# Patient Record
Sex: FEMALE | Race: WHITE | NOT HISPANIC OR LATINO | URBAN - METROPOLITAN AREA
[De-identification: names, ages, dates, MRNs, and addresses within clinical notes are randomized per-mention and may not be internally consistent; named-entity substitution may affect disease eponyms.]

---

## 2017-07-27 ENCOUNTER — OUTPATIENT (OUTPATIENT)
Dept: OUTPATIENT SERVICES | Facility: HOSPITAL | Age: 22
LOS: 1 days | Discharge: HOME | End: 2017-07-27

## 2017-07-27 DIAGNOSIS — M31.1 THROMBOTIC MICROANGIOPATHY: ICD-10-CM

## 2017-08-01 ENCOUNTER — RESULT REVIEW (OUTPATIENT)
Age: 22
End: 2017-08-01

## 2017-08-01 ENCOUNTER — APPOINTMENT (OUTPATIENT)
Dept: HEMATOLOGY ONCOLOGY | Facility: CLINIC | Age: 22
End: 2017-08-01
Payer: COMMERCIAL

## 2017-08-01 VITALS
WEIGHT: 276.68 LBS | DIASTOLIC BLOOD PRESSURE: 70 MMHG | RESPIRATION RATE: 16 BRPM | SYSTOLIC BLOOD PRESSURE: 106 MMHG | OXYGEN SATURATION: 98 % | HEART RATE: 71 BPM | TEMPERATURE: 98.5 F

## 2017-08-01 LAB
BASOPHILS # BLD AUTO: 0 K/UL — SIGNIFICANT CHANGE UP (ref 0–0.2)
BASOPHILS NFR BLD AUTO: 0.6 % — SIGNIFICANT CHANGE UP (ref 0–2)
EOSINOPHIL # BLD AUTO: 0.2 K/UL — SIGNIFICANT CHANGE UP (ref 0–0.5)
EOSINOPHIL NFR BLD AUTO: 1.8 % — SIGNIFICANT CHANGE UP (ref 0–6)
HCT VFR BLD CALC: 36 % — SIGNIFICANT CHANGE UP (ref 34.5–45)
HGB BLD-MCNC: 11.8 G/DL — SIGNIFICANT CHANGE UP (ref 11.5–15.5)
LYMPHOCYTES # BLD AUTO: 2.2 K/UL — SIGNIFICANT CHANGE UP (ref 1–3.3)
LYMPHOCYTES # BLD AUTO: 26.5 % — SIGNIFICANT CHANGE UP (ref 13–44)
MCHC RBC-ENTMCNC: 26.9 PG — LOW (ref 27–34)
MCHC RBC-ENTMCNC: 32.7 G/DL — SIGNIFICANT CHANGE UP (ref 32–36)
MCV RBC AUTO: 82.2 FL — SIGNIFICANT CHANGE UP (ref 80–100)
MONOCYTES # BLD AUTO: 0.5 K/UL — SIGNIFICANT CHANGE UP (ref 0–0.9)
MONOCYTES NFR BLD AUTO: 5.6 % — SIGNIFICANT CHANGE UP (ref 2–14)
NEUTROPHILS # BLD AUTO: 5.4 K/UL — SIGNIFICANT CHANGE UP (ref 1.8–7.4)
NEUTROPHILS NFR BLD AUTO: 65.6 % — SIGNIFICANT CHANGE UP (ref 43–77)
PLATELET # BLD AUTO: 260 K/UL — SIGNIFICANT CHANGE UP (ref 150–400)
RBC # BLD: 4.37 M/UL — SIGNIFICANT CHANGE UP (ref 3.8–5.2)
RBC # FLD: 15.4 % — HIGH (ref 10.3–14.5)
WBC # BLD: 8.3 K/UL — SIGNIFICANT CHANGE UP (ref 3.8–10.5)
WBC # FLD AUTO: 8.3 K/UL — SIGNIFICANT CHANGE UP (ref 3.8–10.5)

## 2017-08-01 PROCEDURE — 99213 OFFICE O/P EST LOW 20 MIN: CPT

## 2017-08-02 LAB
ALBUMIN SERPL ELPH-MCNC: 3.8 G/DL
ALP BLD-CCNC: 63 U/L
ALT SERPL-CCNC: 14 U/L
ANION GAP SERPL CALC-SCNC: 15 MMOL/L
AST SERPL-CCNC: 14 U/L
BILIRUB SERPL-MCNC: 0.3 MG/DL
BUN SERPL-MCNC: 11 MG/DL
CALCIUM SERPL-MCNC: 8.8 MG/DL
CHLORIDE SERPL-SCNC: 104 MMOL/L
CO2 SERPL-SCNC: 21 MMOL/L
CREAT SERPL-MCNC: 0.72 MG/DL
FERRITIN SERPL-MCNC: 33 NG/ML
GLUCOSE SERPL-MCNC: 98 MG/DL
IRON SATN MFR SERPL: 15 %
IRON SERPL-MCNC: 51 UG/DL
LDH SERPL-CCNC: 240 U/L
POTASSIUM SERPL-SCNC: 4.6 MMOL/L
PROT SERPL-MCNC: 6.6 G/DL
SODIUM SERPL-SCNC: 140 MMOL/L
TIBC SERPL-MCNC: 344 UG/DL
UIBC SERPL-MCNC: 293 UG/DL

## 2019-09-03 ENCOUNTER — EMERGENCY (EMERGENCY)
Facility: HOSPITAL | Age: 24
LOS: 1 days | Discharge: HOME | End: 2019-09-03
Attending: EMERGENCY MEDICINE
Payer: COMMERCIAL

## 2019-09-03 VITALS
SYSTOLIC BLOOD PRESSURE: 119 MMHG | OXYGEN SATURATION: 98 % | TEMPERATURE: 98 F | RESPIRATION RATE: 19 BRPM | DIASTOLIC BLOOD PRESSURE: 74 MMHG | HEART RATE: 67 BPM

## 2019-09-03 VITALS — WEIGHT: 279.99 LBS | HEIGHT: 65 IN

## 2019-09-03 LAB
ALBUMIN SERPL ELPH-MCNC: 4 G/DL — SIGNIFICANT CHANGE UP (ref 3.3–5)
ALP SERPL-CCNC: 66 U/L — SIGNIFICANT CHANGE UP (ref 40–120)
ALT FLD-CCNC: 15 U/L — SIGNIFICANT CHANGE UP (ref 10–45)
ANION GAP SERPL CALC-SCNC: 13 MMOL/L — SIGNIFICANT CHANGE UP (ref 5–17)
AST SERPL-CCNC: 12 U/L — SIGNIFICANT CHANGE UP (ref 10–40)
BASE EXCESS BLDV CALC-SCNC: -0.2 MMOL/L — SIGNIFICANT CHANGE UP (ref -2–2)
BASOPHILS # BLD AUTO: 0.1 K/UL — SIGNIFICANT CHANGE UP (ref 0–0.2)
BASOPHILS NFR BLD AUTO: 0.5 % — SIGNIFICANT CHANGE UP (ref 0–2)
BILIRUB SERPL-MCNC: 0.2 MG/DL — SIGNIFICANT CHANGE UP (ref 0.2–1.2)
BUN SERPL-MCNC: 8 MG/DL — SIGNIFICANT CHANGE UP (ref 7–23)
CA-I SERPL-SCNC: 1.16 MMOL/L — SIGNIFICANT CHANGE UP (ref 1.12–1.3)
CALCIUM SERPL-MCNC: 9.4 MG/DL — SIGNIFICANT CHANGE UP (ref 8.4–10.5)
CHLORIDE BLDV-SCNC: 111 MMOL/L — HIGH (ref 96–108)
CHLORIDE SERPL-SCNC: 105 MMOL/L — SIGNIFICANT CHANGE UP (ref 96–108)
CO2 BLDV-SCNC: 26 MMOL/L — SIGNIFICANT CHANGE UP (ref 22–30)
CO2 SERPL-SCNC: 20 MMOL/L — LOW (ref 22–31)
CREAT SERPL-MCNC: 0.66 MG/DL — SIGNIFICANT CHANGE UP (ref 0.5–1.3)
EOSINOPHIL # BLD AUTO: 0.1 K/UL — SIGNIFICANT CHANGE UP (ref 0–0.5)
EOSINOPHIL NFR BLD AUTO: 0.9 % — SIGNIFICANT CHANGE UP (ref 0–6)
GAS PNL BLDV: 135 MMOL/L — SIGNIFICANT CHANGE UP (ref 135–145)
GAS PNL BLDV: SIGNIFICANT CHANGE UP
GLUCOSE BLDV-MCNC: 94 MG/DL — SIGNIFICANT CHANGE UP (ref 70–99)
GLUCOSE SERPL-MCNC: 93 MG/DL — SIGNIFICANT CHANGE UP (ref 70–99)
HCG SERPL-ACNC: <2 MIU/ML — SIGNIFICANT CHANGE UP
HCO3 BLDV-SCNC: 25 MMOL/L — SIGNIFICANT CHANGE UP (ref 21–29)
HCT VFR BLD CALC: 38.8 % — SIGNIFICANT CHANGE UP (ref 34.5–45)
HCT VFR BLDA CALC: 40 % — SIGNIFICANT CHANGE UP (ref 39–50)
HGB BLD CALC-MCNC: 13 G/DL — SIGNIFICANT CHANGE UP (ref 11.5–15.5)
HGB BLD-MCNC: 12.5 G/DL — SIGNIFICANT CHANGE UP (ref 11.5–15.5)
LACTATE BLDV-MCNC: 1.4 MMOL/L — SIGNIFICANT CHANGE UP (ref 0.7–2)
LIDOCAIN IGE QN: 28 U/L — SIGNIFICANT CHANGE UP (ref 7–60)
LYMPHOCYTES # BLD AUTO: 28.5 % — SIGNIFICANT CHANGE UP (ref 13–44)
LYMPHOCYTES # BLD AUTO: 3.1 K/UL — SIGNIFICANT CHANGE UP (ref 1–3.3)
MCHC RBC-ENTMCNC: 27.1 PG — SIGNIFICANT CHANGE UP (ref 27–34)
MCHC RBC-ENTMCNC: 32.3 GM/DL — SIGNIFICANT CHANGE UP (ref 32–36)
MCV RBC AUTO: 84 FL — SIGNIFICANT CHANGE UP (ref 80–100)
MONOCYTES # BLD AUTO: 0.4 K/UL — SIGNIFICANT CHANGE UP (ref 0–0.9)
MONOCYTES NFR BLD AUTO: 3.8 % — SIGNIFICANT CHANGE UP (ref 2–14)
NEUTROPHILS # BLD AUTO: 7.3 K/UL — SIGNIFICANT CHANGE UP (ref 1.8–7.4)
NEUTROPHILS NFR BLD AUTO: 66.2 % — SIGNIFICANT CHANGE UP (ref 43–77)
PCO2 BLDV: 45 MMHG — SIGNIFICANT CHANGE UP (ref 35–50)
PH BLDV: 7.36 — SIGNIFICANT CHANGE UP (ref 7.35–7.45)
PLATELET # BLD AUTO: 336 K/UL — SIGNIFICANT CHANGE UP (ref 150–400)
PO2 BLDV: 30 MMHG — SIGNIFICANT CHANGE UP (ref 25–45)
POTASSIUM BLDV-SCNC: 3.9 MMOL/L — SIGNIFICANT CHANGE UP (ref 3.5–5.3)
POTASSIUM SERPL-MCNC: 4.4 MMOL/L — SIGNIFICANT CHANGE UP (ref 3.5–5.3)
POTASSIUM SERPL-SCNC: 4.4 MMOL/L — SIGNIFICANT CHANGE UP (ref 3.5–5.3)
PROT SERPL-MCNC: 7 G/DL — SIGNIFICANT CHANGE UP (ref 6–8.3)
RBC # BLD: 4.62 M/UL — SIGNIFICANT CHANGE UP (ref 3.8–5.2)
RBC # FLD: 13.7 % — SIGNIFICANT CHANGE UP (ref 10.3–14.5)
SAO2 % BLDV: 46 % — LOW (ref 67–88)
SODIUM SERPL-SCNC: 138 MMOL/L — SIGNIFICANT CHANGE UP (ref 135–145)
WBC # BLD: 11 K/UL — HIGH (ref 3.8–10.5)
WBC # FLD AUTO: 11 K/UL — HIGH (ref 3.8–10.5)

## 2019-09-03 PROCEDURE — 76705 ECHO EXAM OF ABDOMEN: CPT

## 2019-09-03 PROCEDURE — 80053 COMPREHEN METABOLIC PANEL: CPT

## 2019-09-03 PROCEDURE — 85014 HEMATOCRIT: CPT

## 2019-09-03 PROCEDURE — 99284 EMERGENCY DEPT VISIT MOD MDM: CPT

## 2019-09-03 PROCEDURE — 84132 ASSAY OF SERUM POTASSIUM: CPT

## 2019-09-03 PROCEDURE — 99284 EMERGENCY DEPT VISIT MOD MDM: CPT | Mod: 25

## 2019-09-03 PROCEDURE — 83605 ASSAY OF LACTIC ACID: CPT

## 2019-09-03 PROCEDURE — 82435 ASSAY OF BLOOD CHLORIDE: CPT

## 2019-09-03 PROCEDURE — 76705 ECHO EXAM OF ABDOMEN: CPT | Mod: 26

## 2019-09-03 PROCEDURE — 82330 ASSAY OF CALCIUM: CPT

## 2019-09-03 PROCEDURE — 83690 ASSAY OF LIPASE: CPT

## 2019-09-03 PROCEDURE — 85027 COMPLETE CBC AUTOMATED: CPT

## 2019-09-03 PROCEDURE — 82803 BLOOD GASES ANY COMBINATION: CPT

## 2019-09-03 PROCEDURE — 96374 THER/PROPH/DIAG INJ IV PUSH: CPT

## 2019-09-03 PROCEDURE — 82947 ASSAY GLUCOSE BLOOD QUANT: CPT

## 2019-09-03 PROCEDURE — 84702 CHORIONIC GONADOTROPIN TEST: CPT

## 2019-09-03 PROCEDURE — 84295 ASSAY OF SERUM SODIUM: CPT

## 2019-09-03 RX ORDER — ACETAMINOPHEN 500 MG
650 TABLET ORAL ONCE
Refills: 0 | Status: COMPLETED | OUTPATIENT
Start: 2019-09-03 | End: 2019-09-03

## 2019-09-03 RX ORDER — FAMOTIDINE 10 MG/ML
20 INJECTION INTRAVENOUS ONCE
Refills: 0 | Status: COMPLETED | OUTPATIENT
Start: 2019-09-03 | End: 2019-09-03

## 2019-09-03 RX ORDER — SODIUM CHLORIDE 9 MG/ML
1000 INJECTION INTRAMUSCULAR; INTRAVENOUS; SUBCUTANEOUS ONCE
Refills: 0 | Status: COMPLETED | OUTPATIENT
Start: 2019-09-03 | End: 2019-09-03

## 2019-09-03 RX ADMIN — FAMOTIDINE 20 MILLIGRAM(S): 10 INJECTION INTRAVENOUS at 11:30

## 2019-09-03 RX ADMIN — Medication 650 MILLIGRAM(S): at 11:30

## 2019-09-03 RX ADMIN — SODIUM CHLORIDE 1000 MILLILITER(S): 9 INJECTION INTRAMUSCULAR; INTRAVENOUS; SUBCUTANEOUS at 11:31

## 2019-09-03 RX ADMIN — Medication 30 MILLILITER(S): at 11:30

## 2019-09-03 NOTE — ED ADULT NURSE NOTE - HOW OFTEN DO YOU HAVE A DRINK CONTAINING ALCOHOL?
S:  O:  VS reviewed, afebrile   Vitals:    19 1932 19   BP: 111/84 121/74 107/76 105/65   Pulse: 106 81 84 82   Temp:       TempSrc:       Weight:       Height:           FHTs 135, cat 1  UC 3-5  SVE 6/80/-2  AROM of clear fluid    A: IUP @ 38w6d ;     Patient Active Problem List   Diagnosis    Incompetence of cervix    Contraceptive management    History of cerclage, currently pregnant    placement of cervical cerclage, 2nd trimester    Anemia    Normal labor       P:   Continue close monitoring of maternal/fetal status  Anticipate      Never

## 2019-09-03 NOTE — ED ADULT NURSE NOTE - OBJECTIVE STATEMENT
23 yr old female with mother from home c/o persistent epigastric burning, abd cramping, vaginal bleeding, 2nd menstrual cycle in 1 month, +on oral contraceptives, hx TTP, at present no c/o

## 2019-09-03 NOTE — ED PROVIDER NOTE - CLINICAL SUMMARY MEDICAL DECISION MAKING FREE TEXT BOX
cezar 23 f with serveral days of n/v/d now resolved w persitant epigastric pain - pocus to eval for biliary dz, ck lfts and lipase iv hydration and h2 blocker and reeval

## 2019-09-03 NOTE — ED PROVIDER NOTE - PROGRESS NOTE DETAILS
pt feeling mild improvement -- labs reviewed lfts and lipase unremarkable -- rec fu outpt possible endoscopy for persistatn s/s =

## 2019-09-03 NOTE — ED PROVIDER NOTE - PATIENT PORTAL LINK FT
You can access the FollowMyHealth Patient Portal offered by [facility] by registering at the following website: [email or web address]. By joining [employer]’s FollowMyHealth portal, you will also be able to view your health information using other applications (apps) compatible with our system.

## 2019-09-03 NOTE — ED PROVIDER NOTE - NSFOLLOWUPCLINICS_GEN_ALL_ED_FT
[facility]astroenter[facility]  Gastroenterology  [address] [place] [id number]  Phone: [phone]  Fax:   Follow Up Time:

## 2019-09-03 NOTE — ED ADULT TRIAGE NOTE - CHIEF COMPLAINT QUOTE
sat: n/v/d abd pain, dec PO, sun: eating better v/d stopped, still cramping, epig burning on sat: n/v/d abd pain, dec PO, sunday: eating better v/d stopped, still cramping, epig burning, +"2nd period in 1 month", +hx TTP

## 2019-09-03 NOTE — ED PROVIDER NOTE - NSFOLLOWUPINSTRUCTIONS_ED_ALL_ED_FT
You were seen in the emergency department for abdominal pain, vomiting and diarrhea. Please follow up with your primary doctor and a gastroenterologist. Take pepcid 20 mg twice a day and tylenol 650 mg every 6 hours for continued pain. Return to the emergency department immediately if you experience fever, inability to keep food down, or any other concerning symptoms.

## 2019-09-03 NOTE — ED ADULT NURSE NOTE - CHIEF COMPLAINT QUOTE
on sat: n/v/d abd pain, dec PO, sunday: eating better v/d stopped, still cramping, epig burning, +"2nd period in 1 month", +hx TTP

## 2019-09-03 NOTE — ED PROVIDER NOTE - OBJECTIVE STATEMENT
23f w hx TTP 5 yrs ago here c/o abd pain, n/v/d. Pt states sx started 4 days ago. Was vomiting NBNB until 2 days ago and had watery, non-bloody diarrhea until yesterday, but nausea and abd pain have persisted. Pain is epigastric, sharp, radiating up to chest, intermittent in nature, w no obvious alleviating or exacerbating factors. Drinks ETOH socially once every 1-2 wks. No melena or brbpr, no recent travel. Currently menstruating, on OCPs. No urinary sx. Unknown whether fever-did not  check temp. No hx abd surg.

## 2020-08-14 ENCOUNTER — EMERGENCY (EMERGENCY)
Facility: HOSPITAL | Age: 25
LOS: 1 days | Discharge: LEFT BEFORE DISCHARGE | End: 2020-08-14
Attending: EMERGENCY MEDICINE
Payer: COMMERCIAL

## 2020-08-14 VITALS
RESPIRATION RATE: 18 BRPM | WEIGHT: 259.93 LBS | TEMPERATURE: 99 F | HEART RATE: 100 BPM | DIASTOLIC BLOOD PRESSURE: 69 MMHG | SYSTOLIC BLOOD PRESSURE: 132 MMHG | HEIGHT: 65 IN | OXYGEN SATURATION: 97 %

## 2020-08-14 PROBLEM — M31.1 THROMBOTIC MICROANGIOPATHY: Chronic | Status: ACTIVE | Noted: 2019-09-03

## 2020-08-14 PROCEDURE — 90715 TDAP VACCINE 7 YRS/> IM: CPT

## 2020-08-14 PROCEDURE — 73620 X-RAY EXAM OF FOOT: CPT | Mod: 26,LT

## 2020-08-14 PROCEDURE — 73620 X-RAY EXAM OF FOOT: CPT

## 2020-08-14 PROCEDURE — 90471 IMMUNIZATION ADMIN: CPT

## 2020-08-14 PROCEDURE — 99283 EMERGENCY DEPT VISIT LOW MDM: CPT | Mod: 25

## 2020-08-14 PROCEDURE — 99284 EMERGENCY DEPT VISIT MOD MDM: CPT

## 2020-08-14 RX ORDER — CEPHALEXIN 500 MG
500 CAPSULE ORAL ONCE
Refills: 0 | Status: COMPLETED | OUTPATIENT
Start: 2020-08-14 | End: 2020-08-14

## 2020-08-14 RX ORDER — TETANUS TOXOID, REDUCED DIPHTHERIA TOXOID AND ACELLULAR PERTUSSIS VACCINE, ADSORBED 5; 2.5; 8; 8; 2.5 [IU]/.5ML; [IU]/.5ML; UG/.5ML; UG/.5ML; UG/.5ML
0.5 SUSPENSION INTRAMUSCULAR ONCE
Refills: 0 | Status: COMPLETED | OUTPATIENT
Start: 2020-08-14 | End: 2020-08-14

## 2020-08-14 RX ORDER — CEPHALEXIN 500 MG
1 CAPSULE ORAL
Qty: 20 | Refills: 0
Start: 2020-08-14 | End: 2020-08-18

## 2020-08-14 RX ADMIN — Medication 500 MILLIGRAM(S): at 02:39

## 2020-08-14 RX ADMIN — TETANUS TOXOID, REDUCED DIPHTHERIA TOXOID AND ACELLULAR PERTUSSIS VACCINE, ADSORBED 0.5 MILLILITER(S): 5; 2.5; 8; 8; 2.5 SUSPENSION INTRAMUSCULAR at 02:40

## 2020-08-14 NOTE — ED PROVIDER NOTE - NSFOLLOWUPINSTRUCTIONS_ED_ALL_ED_FT
You were evaluated after stepping on a nail.    Your xray was negative for any foreign bodies or fractures.    You were given a tetanus shot.    1) Follow up with your doctor as needed  2) Return to the ED immediately for new or worsening symptoms. Please keep an eye out for signs of infection where you stepped on the nail such as redness, swelling, or pus draining from the wound.  3) Please continue to take any home medications as prescribed  4) Your test results from your ED visit were discussed with you prior to discharge  5) You were provided with a copy of your test results  6) Please  your medicine at the pharmacy and take as directed

## 2020-08-14 NOTE — ED ADULT NURSE NOTE - NSIMPLEMENTINTERV_GEN_ALL_ED
Implemented All Universal Safety Interventions:  [place] to call system. Call bell, personal items and telephone within reach. Instruct patient to call for assistance. Room bathroom lighting operational. Non-slip footwear when patient is off stretcher. Physically safe environment: no spills, clutter or unnecessary equipment. Stretcher in lowest position, wheels locked, appropriate side rails in place.

## 2020-08-14 NOTE — ED PROVIDER NOTE - PMH
Cold sore    High cholesterol    Iron deficiency    TTP (thrombotic thrombocytopenic purpura)    TTP (thrombotic thrombocytopenic purpura)

## 2020-08-14 NOTE — ED PROVIDER NOTE - PATIENT PORTAL LINK FT
You can access the FollowMyHealth Patient Portal offered by [facility] by registering at the following website: [email or web address]. By joining [employer]’s FollowMyHealth portal, you will also be able to view your health information using other applications (apps) compatible with our system.

## 2020-08-14 NOTE — ED PROVIDER NOTE - PROGRESS NOTE DETAILS
Dr. Nhan Ojeda, PGY-3: pt w/o foreign body on xray. To be treated prophylactically w/ keflex. Return precautions provided, pt verbalized understanding. Ready for DC.

## 2020-08-14 NOTE — ED PROVIDER NOTE - PHYSICAL EXAMINATION
Gen: NAD, AOx3, able to make needs known, non-toxic  Head: NCAT  HEENT: EOMI, oral mucosa moist, normal conjunctiva  Lung: CTAB, no respiratory distress, no wheezes/rhonchi/rales B/L, speaking in full sentences  CV: RRR, no murmurs  Abd: soft, NTND, no guarding, no CVA tenderness  MSK: no visible deformities. L lateral foot just below 5th digit, punctate entry wound w/o surrounding erythema or edema that is non tender. No appreciable foreign bodies palpated in foot.  Neuro: Appears non focal  Skin: Warm, well perfused  Psych: normal affect

## 2020-08-14 NOTE — ED PROVIDER NOTE - OBJECTIVE STATEMENT
23 y/o F w/ PMH of HLD, iron deficiency, TTP presenting w/ a complaint of stepping on nail. [place] room 4. Pt reports shortly prior to ED arrival she was walking barefoot in the driveway when she stepped on julissa nail w/ her L foot. Cleaned the wound, applied, bacitracin, and came to ED for evaluation. Minimal pain. Did not take any medication prior to ED arrival. Denies fevers, chills, headache, dizziness, blurred vision, chest pain, cough, shortness of breath, abdominal pain, n/v/d/c, urinary symptoms, rash.

## 2020-08-14 NOTE — ED PROVIDER NOTE - CLINICAL SUMMARY MEDICAL DECISION MAKING FREE TEXT BOX
25 y/o F w/ PMH of HLD, iron deficiency, TTP presenting w/ a puncture wound 2/2 to stepping on nail. Pt well appearing. Exam of foot benign. Pt unsure of last tetanus. Likely soft tissue injury only, but will eval for bony pathology and/or possible foreign body. Plan for adacel, keflex ppx, xray. Expect DC. Will reassess the need for additional interventions as clinically warranted.

## 2020-08-14 NOTE — ED ADULT NURSE NOTE - OBJECTIVE STATEMENT
24 year old female pt presented to the ED co nail to left foot, pt states she was walking barefoot when she felt a sharp pain to bottom of foot, pt states it was a small nail, no bleeding, no swelling no drainage to site , pt able to ambulate no other complaints

## 2020-09-28 ENCOUNTER — OUTPATIENT (OUTPATIENT)
Dept: OUTPATIENT SERVICES | Facility: HOSPITAL | Age: 25
LOS: 1 days | Discharge: HOME | End: 2020-09-28

## 2020-09-28 ENCOUNTER — INPATIENT (INPATIENT)
Facility: HOSPITAL | Age: 25
LOS: 11 days | Discharge: HOME | DRG: 546 | End: 2020-10-10
Attending: HOSPITALIST | Admitting: STUDENT IN AN ORGANIZED HEALTH CARE EDUCATION/TRAINING PROGRAM
Payer: COMMERCIAL

## 2020-09-28 VITALS
SYSTOLIC BLOOD PRESSURE: 139 MMHG | DIASTOLIC BLOOD PRESSURE: 96 MMHG | WEIGHT: 270.07 LBS | OXYGEN SATURATION: 98 % | TEMPERATURE: 99 F | HEIGHT: 65 IN | HEART RATE: 116 BPM | RESPIRATION RATE: 18 BRPM

## 2020-09-28 DIAGNOSIS — M31.1 THROMBOTIC MICROANGIOPATHY: ICD-10-CM

## 2020-09-28 LAB
ALBUMIN SERPL ELPH-MCNC: 4.4 G/DL — SIGNIFICANT CHANGE UP (ref 3.3–5)
ALP SERPL-CCNC: 68 U/L — SIGNIFICANT CHANGE UP (ref 40–120)
ALT FLD-CCNC: 20 U/L — SIGNIFICANT CHANGE UP (ref 10–45)
ANION GAP SERPL CALC-SCNC: 14 MMOL/L — SIGNIFICANT CHANGE UP (ref 5–17)
APPEARANCE UR: CLEAR — SIGNIFICANT CHANGE UP
APTT BLD: 33.3 SEC — SIGNIFICANT CHANGE UP (ref 27.5–35.5)
AST SERPL-CCNC: 33 U/L — SIGNIFICANT CHANGE UP (ref 10–40)
BACTERIA # UR AUTO: NEGATIVE — SIGNIFICANT CHANGE UP
BASE EXCESS BLDV CALC-SCNC: -0.7 MMOL/L — SIGNIFICANT CHANGE UP (ref -2–2)
BILIRUB SERPL-MCNC: 0.6 MG/DL — SIGNIFICANT CHANGE UP (ref 0.2–1.2)
BILIRUB UR-MCNC: NEGATIVE — SIGNIFICANT CHANGE UP
BUN SERPL-MCNC: 20 MG/DL — SIGNIFICANT CHANGE UP (ref 7–23)
CA-I SERPL-SCNC: 1.12 MMOL/L — SIGNIFICANT CHANGE UP (ref 1.12–1.3)
CALCIUM SERPL-MCNC: 10.1 MG/DL — SIGNIFICANT CHANGE UP (ref 8.4–10.5)
CHLORIDE BLDV-SCNC: 107 MMOL/L — SIGNIFICANT CHANGE UP (ref 96–108)
CHLORIDE SERPL-SCNC: 102 MMOL/L — SIGNIFICANT CHANGE UP (ref 96–108)
CO2 BLDV-SCNC: 26 MMOL/L — SIGNIFICANT CHANGE UP (ref 22–30)
CO2 SERPL-SCNC: 22 MMOL/L — SIGNIFICANT CHANGE UP (ref 22–31)
COLOR SPEC: SIGNIFICANT CHANGE UP
CREAT SERPL-MCNC: 0.87 MG/DL — SIGNIFICANT CHANGE UP (ref 0.5–1.3)
DIFF PNL FLD: ABNORMAL
EPI CELLS # UR: 2 /HPF — SIGNIFICANT CHANGE UP
GAS PNL BLDV: 129 MMOL/L — LOW (ref 135–145)
GAS PNL BLDV: SIGNIFICANT CHANGE UP
GAS PNL BLDV: SIGNIFICANT CHANGE UP
GLUCOSE BLDV-MCNC: 93 MG/DL — SIGNIFICANT CHANGE UP (ref 70–99)
GLUCOSE SERPL-MCNC: 97 MG/DL — SIGNIFICANT CHANGE UP (ref 70–99)
GLUCOSE UR QL: NEGATIVE — SIGNIFICANT CHANGE UP
HCG SERPL-ACNC: <2 MIU/ML — SIGNIFICANT CHANGE UP
HCO3 BLDV-SCNC: 25 MMOL/L — SIGNIFICANT CHANGE UP (ref 21–29)
HCT VFR BLD CALC: 33.1 % — LOW (ref 34.5–45)
HCT VFR BLD CALC: 35.2 % — SIGNIFICANT CHANGE UP (ref 34.5–45)
HCT VFR BLDA CALC: 36 % — LOW (ref 39–50)
HGB BLD CALC-MCNC: 11.6 G/DL — SIGNIFICANT CHANGE UP (ref 11.5–15.5)
HGB BLD-MCNC: 10.7 G/DL — LOW (ref 11.5–15.5)
HGB BLD-MCNC: 11.4 G/DL — LOW (ref 11.5–15.5)
HYALINE CASTS # UR AUTO: 2 /LPF — SIGNIFICANT CHANGE UP (ref 0–2)
INR BLD: 1.07 RATIO — SIGNIFICANT CHANGE UP (ref 0.88–1.16)
KETONES UR-MCNC: NEGATIVE — SIGNIFICANT CHANGE UP
LACTATE BLDV-MCNC: 1.4 MMOL/L — SIGNIFICANT CHANGE UP (ref 0.7–2)
LDH SERPL L TO P-CCNC: 540 U/L — HIGH (ref 50–242)
LEUKOCYTE ESTERASE UR-ACNC: NEGATIVE — SIGNIFICANT CHANGE UP
MCHC RBC-ENTMCNC: 26.6 PG — LOW (ref 27–34)
MCHC RBC-ENTMCNC: 26.8 PG — LOW (ref 27–34)
MCHC RBC-ENTMCNC: 32.3 GM/DL — SIGNIFICANT CHANGE UP (ref 32–36)
MCHC RBC-ENTMCNC: 32.4 GM/DL — SIGNIFICANT CHANGE UP (ref 32–36)
MCV RBC AUTO: 82.2 FL — SIGNIFICANT CHANGE UP (ref 80–100)
MCV RBC AUTO: 83 FL — SIGNIFICANT CHANGE UP (ref 80–100)
NITRITE UR-MCNC: NEGATIVE — SIGNIFICANT CHANGE UP
NRBC # BLD: 0 /100 WBCS — SIGNIFICANT CHANGE UP (ref 0–0)
NRBC # BLD: 0 /100 WBCS — SIGNIFICANT CHANGE UP (ref 0–0)
PCO2 BLDV: 47 MMHG — SIGNIFICANT CHANGE UP (ref 35–50)
PH BLDV: 7.34 — LOW (ref 7.35–7.45)
PH UR: 6 — SIGNIFICANT CHANGE UP (ref 5–8)
PLATELET # BLD AUTO: 21 K/UL — LOW (ref 150–400)
PLATELET # BLD AUTO: 27 K/UL — LOW (ref 150–400)
PO2 BLDV: 20 MMHG — LOW (ref 25–45)
POTASSIUM BLDV-SCNC: 6.9 MMOL/L — CRITICAL HIGH (ref 3.5–5.3)
POTASSIUM SERPL-MCNC: 4.1 MMOL/L — SIGNIFICANT CHANGE UP (ref 3.5–5.3)
POTASSIUM SERPL-SCNC: 4.1 MMOL/L — SIGNIFICANT CHANGE UP (ref 3.5–5.3)
PROT SERPL-MCNC: 7.2 G/DL — SIGNIFICANT CHANGE UP (ref 6–8.3)
PROT UR-MCNC: 100 — SIGNIFICANT CHANGE UP
PROTHROM AB SERPL-ACNC: 12.7 SEC — SIGNIFICANT CHANGE UP (ref 10.6–13.6)
RBC # BLD: 3.99 M/UL — SIGNIFICANT CHANGE UP (ref 3.8–5.2)
RBC # BLD: 4.28 M/UL — SIGNIFICANT CHANGE UP (ref 3.8–5.2)
RBC # FLD: 14.9 % — HIGH (ref 10.3–14.5)
RBC # FLD: 15.1 % — HIGH (ref 10.3–14.5)
RBC CASTS # UR COMP ASSIST: 10 /HPF — HIGH (ref 0–4)
SAO2 % BLDV: 25 % — LOW (ref 67–88)
SARS-COV-2 RNA SPEC QL NAA+PROBE: SIGNIFICANT CHANGE UP
SODIUM SERPL-SCNC: 138 MMOL/L — SIGNIFICANT CHANGE UP (ref 135–145)
SP GR SPEC: 1.01 — SIGNIFICANT CHANGE UP (ref 1.01–1.02)
UROBILINOGEN FLD QL: NEGATIVE — SIGNIFICANT CHANGE UP
WBC # BLD: 12.81 K/UL — HIGH (ref 3.8–10.5)
WBC # BLD: 14.09 K/UL — HIGH (ref 3.8–10.5)
WBC # FLD AUTO: 12.81 K/UL — HIGH (ref 3.8–10.5)
WBC # FLD AUTO: 14.09 K/UL — HIGH (ref 3.8–10.5)
WBC UR QL: 3 /HPF — SIGNIFICANT CHANGE UP (ref 0–5)

## 2020-09-28 PROCEDURE — 99285 EMERGENCY DEPT VISIT HI MDM: CPT

## 2020-09-28 PROCEDURE — 71045 X-RAY EXAM CHEST 1 VIEW: CPT | Mod: 26

## 2020-09-28 PROCEDURE — 93010 ELECTROCARDIOGRAM REPORT: CPT | Mod: 59

## 2020-09-28 RX ORDER — SODIUM CHLORIDE 9 MG/ML
1000 INJECTION INTRAMUSCULAR; INTRAVENOUS; SUBCUTANEOUS
Refills: 0 | Status: DISCONTINUED | OUTPATIENT
Start: 2020-09-28 | End: 2020-09-29

## 2020-09-28 RX ADMIN — SODIUM CHLORIDE 100 MILLILITER(S): 9 INJECTION INTRAMUSCULAR; INTRAVENOUS; SUBCUTANEOUS at 20:50

## 2020-09-28 NOTE — CONSULT NOTE ADULT - ATTENDING COMMENTS
C/D/W Dr. Vo overnight via telephone.    Consult team to round on patient on 9/29.      Kishan Ibrahim MD

## 2020-09-28 NOTE — ED PROVIDER NOTE - NS_ ATTENDINGSCRIBEDETAILS _ED_A_ED_FT
History and Physical performed by me with scribe under my direct supervision.  HARESH Tomlin MD FACEP

## 2020-09-28 NOTE — CONSULT NOTE ADULT - ASSESSMENT
24 F with hx of TTP (followed by Dr. Berman at [facility]) s/p 22 sessions of PLEX at [facility] in 2014 followed by 4 doses of weekly Rituxan presenting with platelet count of 21K concerning for relapse of TTP.     #Relapsed TTP:  Patient was initially diagnosed at age 18 and was treated at [facility] with 22 sessions of PLEX followed by Rituxan by Dr. Berman at [facility]  Pt now with plt count of 21K (plt was 336K September 3rd)  Peripheral smear with 3-5 schistocytes per HPF  LDH and retic elevated, haptoglobin sent  [id number] level sent overnight   Check hepatitis panel  PLASMIC score of 7 indication high risk   Appreciate MICU eval and placement of shiley catheter to start PLEX- pt consented to PLEX per blood bank- Please call blood bank directly to inform them when a catheter has been placed so that they can start PLEX. In the interim, please give patient 2 units of FFP.   Started pulse dose steroids with methylprednisolone 1g IV daily for 3 days- please give stat dose tonight. 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brahim[facility] attending Dr. Blanco,    Nadiya Vo MD  Hematology Oncology Fellow, PGY-5  [facility] Pager: [phone]/ [facility] Pager: [phone]   24 F with hx of TTP (followed by Dr. Berman at [facility]) s/p 22 sessions of PLEX at [facility] in 2014 followed by 4 doses of weekly Rituxan presenting with platelet count of 21K concerning for relapse of TTP.     #Relapsed TTP:  Patient was initially diagnosed at age 18 and was treated at [facility] with 22 sessions of PLEX followed by Rituxan by Dr. Berman at [facility]  Pt now with plt count of 21K (plt was 336K September 3rd)  Peripheral smear with 3-5 schistocytes per HPF  LDH and retic elevated, haptoglobin sent  [id number] level sent overnight   Check hepatitis panel  PLASMIC score of 7 indicating high risk   Appreciate MICU eval and placement of shiley catheter to start PLEX- pt consented to PLEX per blood bank- Please call blood bank directly to inform them when a catheter has been placed so that they can start PLEX. In the interim, please give patient 2 units of FFP.   Started pulse dose steroids with methylprednisolone 1g IV daily for 3 days- please give stat dose tonight. 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 Ibrahim, MICU, blood bank attending Dr. Blanco,    Nadiya Vo MD  Hematology Oncology Fellow, PGY-5  [facility] Pager: [phone]/ [facility] Pager: [phone]

## 2020-09-28 NOTE — ED ADULT NURSE NOTE - NSIMPLEMENTINTERV_GEN_ALL_ED
Implemented All Universal Safety Interventions:  [place] to call system. Call bell, personal items and telephone within reach. Instruct patient to call for assistance. Room bathroom lighting operational. Non-slip footwear when patient is off stretcher. Physically safe environment: no spills, clutter or unnecessary equipment. Stretcher in lowest position, wheels locked, appropriate side rails in place.

## 2020-09-28 NOTE — ED PROVIDER NOTE - OBJECTIVE STATEMENT
24 y.o F with pmhx of HLD, iron deficiency, TTP in 2014, and bilateral hip replacements presents with x3 days of bruising through her extremities. Pt reports largest bruise on forearm was 2/2 dog bite. States she noted on Friday bruising on bilateral lower extremities and upper extremities. Noted faint petechiae on lower extremities last night. Pt also had cyst under her arm and was prescribed doxy by dermatologist. Reports endorsing multiple vitamins for past three weeks. +emesis on Thursday and Friday. Denies hematuria, fever, chills, HA, cough, tobacco use, recent travels, or etoh use. 24 y.o F with pmhx of HLD, iron deficiency, TTP in 2014, and bilateral hip replacements presents with x3 days of bruising through her extremities. Pt reports largest bruise on forearm was 2/2 dog bite. States she noted on Friday bruising on bilateral lower extremities and upper extremities. Noted faint petechiae on lower extremities last night. Pt also had cyst under her arm and was prescribed doxy by dermatologist. Reports endorsing multiple vitamins for past three weeks. +emesis on Thursday and Friday. Denies hematuria, fever, chills, HA, cough, tobacco use, recent travels, or etoh use.  Hematologist: Dr. Berman ~[facility]/onc [facility]  24 y.o F with pmhx of HLD, iron deficiency, TTP in 2014, and bilateral hip replacements presents with x3 days of bruising through her extremities. Pt reports largest bruise on forearm was 2/2 dog bite. States she noted on Friday bruising on bilateral lower extremities and upper extremities. Noted faint petechiae on lower extremities last night. Pt also had cyst under her arm and was prescribed doxy by dermatologist. Reports endorsing multiple vitamins for past three weeks. +emesis on Thursday and Friday. Denies hematuria, fever, chills, HA, cough, tobacco use, recent travels, or etoh use.  Hematologist: Dr. Berman ~[facility]

## 2020-09-28 NOTE — ED ADULT NURSE NOTE - OBJECTIVE STATEMENT
complaining of bruising since friday. Pt states, "I have TTP, I was treating 6 years ago and have been in remission since. I think Im having a relapse, I have bruising on my left arm and thighs and I didn't bang them on anything." Pt endorses bruising. Pt denies headache, lightheadedness, dizziness, blurred vision, SOB, chest pain, abdominal pain, N.V.D. urinary symptoms, numbness and tingling at present.

## 2020-09-28 NOTE — ED PROVIDER NOTE - PROGRESS NOTE DETAILS
MD Jayy: Patient reassessed at bedside and found to be lying comfortably in bed. On further discussion with patient, she may or may not have had a cold ~2 weeks ago resulting in increased rhinorrhea and nasal congestion but may attribute it to the changing of weather. Will await complete lab results and reevaluate. [facility] paged  Lance Tomlin MD, Facep MD Jayy: Discussed w/ patient the significant platelet value of 27 and the need to workup platelet disorder further as inpatient. Patient recalls last time she had her platelets in the 20's she had further downtrending platelets in the 10's and felt generally weak. Plan to repeat cbc and other labs per heme

## 2020-09-28 NOTE — ED ADULT TRIAGE NOTE - CHIEF COMPLAINT QUOTE
Hx of thrombotic thrombocytopenic purpura. Patient states that she started to get multiple bruises since Friday, petechiae  on hands and chest since last night.  denies known accident/injury. Has been taking tumeric for the last 4 weeks.

## 2020-09-28 NOTE — ED PROVIDER NOTE - CLINICAL SUMMARY MEDICAL DECISION MAKING FREE TEXT BOX
Lance Tomlin): Adult female with three day hx of bruising. Remote hx of TTP otherwise well. Largest upper extremity bruising caused by dog bite. Lower extremity bruising are very small. No bloody blisters in mouth. Will obtain labs, LDH, and if all negative, will consider discharge for outpatient follow up.

## 2020-09-29 ENCOUNTER — APPOINTMENT (OUTPATIENT)
Dept: HEMATOLOGY ONCOLOGY | Facility: CLINIC | Age: 25
End: 2020-09-29

## 2020-09-29 LAB
ALBUMIN SERPL ELPH-MCNC: 4.3 G/DL — SIGNIFICANT CHANGE UP (ref 3.3–5)
ALP SERPL-CCNC: 70 U/L — SIGNIFICANT CHANGE UP (ref 40–120)
ALT FLD-CCNC: 22 U/L — SIGNIFICANT CHANGE UP (ref 10–45)
ANION GAP SERPL CALC-SCNC: 14 MMOL/L — SIGNIFICANT CHANGE UP (ref 5–17)
APTT BLD: 30 SEC — SIGNIFICANT CHANGE UP (ref 27.5–35.5)
AST SERPL-CCNC: 35 U/L — SIGNIFICANT CHANGE UP (ref 10–40)
BASE EXCESS BLDV CALC-SCNC: -1.2 MMOL/L — SIGNIFICANT CHANGE UP (ref -2–2)
BASOPHILS # BLD AUTO: 0.03 K/UL — SIGNIFICANT CHANGE UP (ref 0–0.2)
BASOPHILS NFR BLD AUTO: 0.3 % — SIGNIFICANT CHANGE UP (ref 0–2)
BILIRUB SERPL-MCNC: 0.9 MG/DL — SIGNIFICANT CHANGE UP (ref 0.2–1.2)
BUN SERPL-MCNC: 16 MG/DL — SIGNIFICANT CHANGE UP (ref 7–23)
CA-I SERPL-SCNC: 1.25 MMOL/L — SIGNIFICANT CHANGE UP (ref 1.12–1.3)
CALCIUM SERPL-MCNC: 10 MG/DL — SIGNIFICANT CHANGE UP (ref 8.4–10.5)
CHLORIDE BLDV-SCNC: 106 MMOL/L — SIGNIFICANT CHANGE UP (ref 96–108)
CHLORIDE SERPL-SCNC: 105 MMOL/L — SIGNIFICANT CHANGE UP (ref 96–108)
CO2 BLDV-SCNC: 25 MMOL/L — SIGNIFICANT CHANGE UP (ref 22–30)
CO2 SERPL-SCNC: 19 MMOL/L — LOW (ref 22–31)
CREAT SERPL-MCNC: 0.67 MG/DL — SIGNIFICANT CHANGE UP (ref 0.5–1.3)
EOSINOPHIL # BLD AUTO: 0 K/UL — SIGNIFICANT CHANGE UP (ref 0–0.5)
EOSINOPHIL NFR BLD AUTO: 0 % — SIGNIFICANT CHANGE UP (ref 0–6)
FIBRINOGEN PPP-MCNC: 623 MG/DL — HIGH (ref 350–510)
GAS PNL BLDV: 139 MMOL/L — SIGNIFICANT CHANGE UP (ref 135–145)
GAS PNL BLDV: SIGNIFICANT CHANGE UP
GAS PNL BLDV: SIGNIFICANT CHANGE UP
GLUCOSE BLDC GLUCOMTR-MCNC: 197 MG/DL — HIGH (ref 70–99)
GLUCOSE BLDC GLUCOMTR-MCNC: 205 MG/DL — HIGH (ref 70–99)
GLUCOSE BLDV-MCNC: 160 MG/DL — HIGH (ref 70–99)
GLUCOSE SERPL-MCNC: 156 MG/DL — HIGH (ref 70–99)
HAPTOGLOB SERPL-MCNC: <20 MG/DL — LOW (ref 34–200)
HAV IGM SER-ACNC: SIGNIFICANT CHANGE UP
HBV CORE IGM SER-ACNC: SIGNIFICANT CHANGE UP
HBV SURFACE AG SER-ACNC: SIGNIFICANT CHANGE UP
HCG UR QL: NEGATIVE — SIGNIFICANT CHANGE UP
HCO3 BLDV-SCNC: 23 MMOL/L — SIGNIFICANT CHANGE UP (ref 21–29)
HCT VFR BLD CALC: 32.3 % — LOW (ref 34.5–45)
HCT VFR BLDA CALC: 33 % — LOW (ref 39–50)
HCV AB S/CO SERPL IA: 0.08 S/CO — SIGNIFICANT CHANGE UP (ref 0–0.99)
HCV AB SERPL-IMP: SIGNIFICANT CHANGE UP
HGB BLD CALC-MCNC: 10.7 G/DL — LOW (ref 11.5–15.5)
HGB BLD-MCNC: 10.4 G/DL — LOW (ref 11.5–15.5)
HOROWITZ INDEX BLDV+IHG-RTO: 21 — SIGNIFICANT CHANGE UP
IMM GRANULOCYTES NFR BLD AUTO: 1 % — SIGNIFICANT CHANGE UP (ref 0–1.5)
INR BLD: 1.12 RATIO — SIGNIFICANT CHANGE UP (ref 0.88–1.16)
LACTATE BLDV-MCNC: 1.9 MMOL/L — SIGNIFICANT CHANGE UP (ref 0.7–2)
LYMPHOCYTES # BLD AUTO: 1.31 K/UL — SIGNIFICANT CHANGE UP (ref 1–3.3)
LYMPHOCYTES # BLD AUTO: 11.6 % — LOW (ref 13–44)
MAGNESIUM SERPL-MCNC: 2 MG/DL — SIGNIFICANT CHANGE UP (ref 1.6–2.6)
MCHC RBC-ENTMCNC: 26.4 PG — LOW (ref 27–34)
MCHC RBC-ENTMCNC: 32.2 GM/DL — SIGNIFICANT CHANGE UP (ref 32–36)
MCV RBC AUTO: 82 FL — SIGNIFICANT CHANGE UP (ref 80–100)
MONOCYTES # BLD AUTO: 0.11 K/UL — SIGNIFICANT CHANGE UP (ref 0–0.9)
MONOCYTES NFR BLD AUTO: 1 % — LOW (ref 2–14)
NEUTROPHILS # BLD AUTO: 9.72 K/UL — HIGH (ref 1.8–7.4)
NEUTROPHILS NFR BLD AUTO: 86.1 % — HIGH (ref 43–77)
NRBC # BLD: 0 /100 WBCS — SIGNIFICANT CHANGE UP (ref 0–0)
OTHER CELLS CSF MANUAL: 10 ML/DL — LOW (ref 18–22)
PCO2 BLDV: 41 MMHG — SIGNIFICANT CHANGE UP (ref 35–50)
PH BLDV: 7.38 — SIGNIFICANT CHANGE UP (ref 7.35–7.45)
PHOSPHATE SERPL-MCNC: 3.8 MG/DL — SIGNIFICANT CHANGE UP (ref 2.5–4.5)
PLATELET # BLD AUTO: 22 K/UL — LOW (ref 150–400)
PO2 BLDV: 38 MMHG — SIGNIFICANT CHANGE UP (ref 25–45)
POTASSIUM BLDV-SCNC: 4.3 MMOL/L — SIGNIFICANT CHANGE UP (ref 3.5–5.3)
POTASSIUM SERPL-MCNC: 4.4 MMOL/L — SIGNIFICANT CHANGE UP (ref 3.5–5.3)
POTASSIUM SERPL-SCNC: 4.4 MMOL/L — SIGNIFICANT CHANGE UP (ref 3.5–5.3)
PROT SERPL-MCNC: 7.3 G/DL — SIGNIFICANT CHANGE UP (ref 6–8.3)
PROTHROM AB SERPL-ACNC: 13.3 SEC — SIGNIFICANT CHANGE UP (ref 10.6–13.6)
RBC # BLD: 3.94 M/UL — SIGNIFICANT CHANGE UP (ref 3.8–5.2)
RBC # FLD: 14.9 % — HIGH (ref 10.3–14.5)
SAO2 % BLDV: 65 % — LOW (ref 67–88)
SODIUM SERPL-SCNC: 138 MMOL/L — SIGNIFICANT CHANGE UP (ref 135–145)
WBC # BLD: 11.28 K/UL — HIGH (ref 3.8–10.5)
WBC # FLD AUTO: 11.28 K/UL — HIGH (ref 3.8–10.5)

## 2020-09-29 PROCEDURE — 99291 CRITICAL CARE FIRST HOUR: CPT | Mod: 25

## 2020-09-29 PROCEDURE — 36556 INSERT NON-TUNNEL CV CATH: CPT | Mod: GC

## 2020-09-29 PROCEDURE — 71045 X-RAY EXAM CHEST 1 VIEW: CPT | Mod: 26

## 2020-09-29 PROCEDURE — 99223 1ST HOSP IP/OBS HIGH 75: CPT | Mod: GC

## 2020-09-29 PROCEDURE — 70450 CT HEAD/BRAIN W/O DYE: CPT | Mod: 26

## 2020-09-29 PROCEDURE — 36514 APHERESIS PLASMA: CPT

## 2020-09-29 RX ORDER — INFLUENZA VIRUS VACCINE 15; 15; 15; 15 UG/.5ML; UG/.5ML; UG/.5ML; UG/.5ML
0.5 SUSPENSION INTRAMUSCULAR ONCE
Refills: 0 | Status: DISCONTINUED | OUTPATIENT
Start: 2020-09-29 | End: 2020-10-10

## 2020-09-29 RX ORDER — CHLORHEXIDINE GLUCONATE 213 G/1000ML
1 SOLUTION TOPICAL
Refills: 0 | Status: DISCONTINUED | OUTPATIENT
Start: 2020-09-29 | End: 2020-10-10

## 2020-09-29 RX ORDER — SODIUM CHLORIDE 9 MG/ML
10 INJECTION INTRAMUSCULAR; INTRAVENOUS; SUBCUTANEOUS
Refills: 0 | Status: DISCONTINUED | OUTPATIENT
Start: 2020-09-29 | End: 2020-10-10

## 2020-09-29 RX ADMIN — Medication 58 MILLIGRAM(S): at 01:28

## 2020-09-29 RX ADMIN — CHLORHEXIDINE GLUCONATE 1 APPLICATION(S): 213 SOLUTION TOPICAL at 05:04

## 2020-09-29 RX ADMIN — SODIUM CHLORIDE 100 MILLILITER(S): 9 INJECTION INTRAMUSCULAR; INTRAVENOUS; SUBCUTANEOUS at 04:09

## 2020-09-29 NOTE — PROGRESS NOTE ADULT - SUBJECTIVE AND OBJECTIVE BOX
Patient was admitted to the medical service however upon further review and discussion with the Critical Care team, due to concerns with TTP patient will be taken under the care of the MICU currently.

## 2020-09-29 NOTE — H&P ADULT - NSHPREVIEWOFSYSTEMS_GEN_ALL_CORE
CONSTITUTIONAL:  No weakness, fevers, chills, or unintentional weight loss  HEENT:  Head, Eyes:  No trauma or visual changes. Ears, Nose, Throat:  No dizziness, changes in hearing, or throat pain  NECK: No pain or stiffness  CARDIOVASCULAR:  No chest pain, chest pressure or chest discomfort. No palpitations.  RESPIRATORY:  No shortness of breath, cough or sputum or hemoptysis.  GASTROINTESTINAL:  No abdominal pain, N/V, or constipation/diarrhea  GENITOURINARY:  Denies hematuria, dysuria.   NEUROLOGICAL:  No numbness or weakness  MUSCULOSKELETAL:  No muscle, back pain, joint pain or stiffness.  HEMATOLOGIC: No bleeding or bruising  SKIN:  No rash or itching.  All other review of systems is negative unless indicated above CONSTITUTIONAL:  No weakness, fevers, chills, or unintentional weight loss  HEENT:  Head, Eyes: + visual changes. Ears, Nose, Throat:  No dizziness, changes in hearing, or throat pain  CARDIOVASCULAR:  No chest pain  RESPIRATORY:  No shortness of breath  GASTROINTESTINAL:  No abdominal pain  GENITOURINARY:  Denies hematuria, dysuria.   NEUROLOGICAL:  No numbness or weakness  MUSCULOSKELETAL:  No muscle, back pain, joint pain or stiffness.  HEMATOLOGIC: + bruising  SKIN: + rash  All other review of systems is negative unless indicated above

## 2020-09-29 NOTE — H&P ADULT - NSICDXPASTMEDICALHX_GEN_ALL_CORE_FT
PAST MEDICAL HISTORY:  Cold sore     High cholesterol     Iron deficiency     TTP (thrombotic thrombocytopenic purpura)     TTP (thrombotic thrombocytopenic purpura)

## 2020-09-29 NOTE — H&P ADULT - NSHPLABSRESULTS_GEN_ALL_CORE
10.7   12.81 )-----------( 21       ( 28 Sep 2020 23:50 )             33.1     Auto Eosinophil # 0.09  / Auto Eosinophil % 0.7   / Auto Neutrophil # 8.64  / Auto Neutrophil % 67.9  / BANDS % x                            11.4   14.09 )-----------(        ( 28 Sep 2020 20:56 )             35.2     Auto Eosinophil # x     / Auto Eosinophil % x     / Auto Neutrophil # x     / Auto Neutrophil % x     / BANDS % x            138  |  102  |  20  ----------------------------<  97  4.1   |  22  |  0.87    Ca    10.1      28 Sep 2020 20:56  TPro  7.2  /  Alb  4.4  /  TBili  0.6  /  DBili  x   /  AST  33  /  ALT  20  /  AlkPhos  68  09-28    PT/INR - ( 28 Sep 2020 20:56 )   PT: 12.7 sec;   INR: 1.07 ratio         PTT - ( 28 Sep 2020 20:56 )  PTT:33.3 sec      Urinalysis Basic - ( 28 Sep 2020 22:19 )    Color: Light Yellow / Appearance: Clear / S.015 / pH: x  Gluc: x / Ketone: Negative  / Bili: Negative / Urobili: Negative   Blood: x / Protein: 100 / Nitrite: Negative   Leuk Esterase: Negative / RBC: 10 /hpf / WBC 3 /HPF   Sq Epi: x / Non Sq Epi: 2 /hpf / Bacteria: Negative

## 2020-09-29 NOTE — PROGRESS NOTE ADULT - ASSESSMENT
24F h/o TTP p/w bruising and petechiae, PLT<30, and PLASMIC score > 6 (high likelihood [id number] deficiency). Most likely TTP relapse. Admitted to the MICU for urgent Shiley and PLEX. In interim, receiving pulse steroids and FFP.    Neuro  - neuro checks q1H, CTH if mental status worsens    Cardiovascular   - cardiovascularly stable  - no active interventions    Pulmonary  - on RA, no active issues    GI   -     Renal/electrolytes      Heme  TTP exacerbation  - s/p 2 units plasma, pulse steroid 3 day course  - Shiley placement  - initiate PLEX after: planned for 7:30am, per blood bank    Endocrine  - q6 FSG while on pulse seroids    ID    Ppx  DVT ppx: ambulatory at baseline  Diet: regular  Dispo: pending 24F h/o TTP p/w bruising and petechiae, PLT<30, and PLASMIC score > 6 (high likelihood [id number] deficiency). Most likely TTP relapse. Admitted to the MICU for urgent Shiley and PLEX. In interim, receiving pulse steroids and FFP.    Neuro  - neuro checks q1H, CTH if mental status worsens    Cardiovascular   - cardiovascularly stable  - no active interventions    Pulmonary  - on RA, no active issues    GI   - no active issues    Renal/electrolytes  - no active issues    Heme  TTP exacerbation  - s/p 2 units plasma, pulse steroid 3 day course  - Shiley placement  - initiate PLEX after: planned for 7:30am, per blood bank  - monitor anemia    Endocrine  - q6 FSG while on pulse seroids    ID  Leukocytosis, likely reactive  -NTD    Ppx  DVT ppx: ambulatory at baseline  Diet: regular  Dispo: pending 24F h/o TTP p/w bruising and petechiae, PLT<30, and PLASMIC score > 6 (high likelihood [id number] deficiency). Most likely TTP relapse. Admitted to the MICU for urgent Shiley and PLEX. In interim, receiving pulse steroids and FFP.    Neuro  - no active issues at this time.   - visual changes have since resolved  - will obtain CTH to r/o bleeds (given low platelets and recent visual changes)  - neuro checks q2H    Cardiovascular   - cardiovascularly stable  - no active interventions    Pulmonary  - on RA, no active issues    GI   - no active issues    Renal/electrolytes  - no active issues    Heme  TTP exacerbation  - s/p 2 units plasma, pulse steroid 3 day course, per Hem/Onc  - Shiley placement  - PLEX after: planned for 7:30am, per blood bank  - monitor anemia    Endocrine  - q6 FSG while on pulse steroids    ID  Leukocytosis, likely reactive  -NTD    Ppx  DVT ppx: ambulatory at baseline  Diet: regular  Dispo: pending

## 2020-09-29 NOTE — H&P ADULT - ASSESSMENT
TTP exacerbation    Neuro  - neuro checks q1H, CTH if mental status worsens    Cardiovascular       Pulmonary    GI     Renal/electrolytes      Heme  TTP exacerbation  - s/p 2 units plasma, steroid 3 day course  - Shiley placement  - initiate PEX after    Endocrine    ID    Ppx  DVT ppx: ambulatory at baseline  Diet: regular  Dispo: pending TTP exacerbation    Neuro  - neuro checks q1H, CTH if mental status worsens    Cardiovascular       Pulmonary    GI     Renal/electrolytes      Heme  TTP exacerbation  - s/p 2 units plasma, pulse steroid 3 day course  - Shiley placement  - initiate PLEX after    Endocrine  - q6 FSG while on pulse seroids    ID    Ppx  DVT ppx: ambulatory at baseline  Diet: regular  Dispo: pending TTP exacerbation    Neuro  - neuro checks q1H, CTH if mental status worsens    Cardiovascular   - NTD    Pulmonary  - NTD    GI   - NTD    Renal/electrolytes  - NTD    Heme  TTP exacerbation  - s/p 2 units plasma, pulse steroid 3 day course  - Shiley placement  - initiate PLEX after  Anemia  - monitor    Endocrine  - q6 FSG while on pulse steroids    ID  Leukocytosis  - likely reactive    Ppx  DVT ppx: ambulatory at baseline  Diet: regular  Dispo: pending

## 2020-09-29 NOTE — CONSULT NOTE ADULT - ASSESSMENT
Patient is a 24y old  Female who presents with a chief complaint of ecchymosis and headaches. H/O of TTP in 2014, 22 sessions of plasma exchange at [facility], high dose steroids, and four doses of Rituxan. She complaints of headaches, fatigue and "seeing black dots".  Her platelets count 21k,  and PBS according to Dr. Vo shows schistocytes.  Considering her history and current presentation TTP is on the top of differential diagnosis. Consent was taken over the phone and after explaining the procedure and risk/benefit she agreed with the procedure.  Apheresis was arranged with [facility] apheres[facility] center and will be done as soon as line placement, with 5000 cc plasma. Meanwhile recommended to give two units of FFP. Plasma exchange will continue daily till platelets count increases to 150k.   Please notify blood bank [phone] when line is placed.

## 2020-09-29 NOTE — H&P ADULT - HISTORY OF PRESENT ILLNESS
24F hx of TTP p/w bruising and petechiae. Spontaneous bruise on right arm occurred on Friday and petechiae appeared last night over legs bilaterally last night and patient also started seeing black spots in her vision and prompted her to come to ED. Pt denies fevers, chills, sudden weakness or changes in sensation in arms legs, no CP, no SOB, no abdominal pain, no urine or bowel issues.

## 2020-09-29 NOTE — PROGRESS NOTE ADULT - SUBJECTIVE AND OBJECTIVE BOX
Tmax overnight 99.5.  Plt 22 this AM.  PLEX to be initiated this AM.    Vital Signs Last 24 Hrs  T(C): 36.8 (29 Sep 2020 08:00), Max: 37.5 (29 Sep 2020 00:57)  T(F): 98.2 (29 Sep 2020 08:00), Max: 99.5 (29 Sep 2020 00:57)  HR: 97 (29 Sep 2020 10:00) (74 - 116)  BP: 128/71 (29 Sep 2020 10:00) (117/60 - 156/87)  BP(mean): 94 (29 Sep 2020 10:00) (82 - 99)  RR: 22 (29 Sep 2020 10:00) (16 - 26)  SpO2: 99% (29 Sep 2020 10:00) (97% - 100%)  PHYSICAL EXAM:    GENERAL: NAD, AAOx3   HEAD:  NC/AT  EYES: EOMI, PERRLA, no scleral icterus  HEENT: Moist mucous membranes  LUNG: Clear to auscultation bilaterally; No rales, rhonchi, wheezing, or rubs  HEART: RRR; No murmurs, rubs, or gallops  ABDOMEN: +BS, ST/ND/NT  EXTREMITIES:  2+ Peripheral Pulses, No clubbing, cyanosis, or edema  LAD: no palpable adenopathy  09-29    138  |  105  |  16  ----------------------------<  156<H>  4.4   |  19<L>  |  0.67    Ca    10.0      29 Sep 2020 05:22  Phos  3.8     09-29  Mg     2.0     09-29    TPro  7.3  /  Alb  4.3  /  TBili  0.9  /  DBili  x   /  AST  35  /  ALT  22  /  AlkPhos  70  09-29      CBC Full  -  ( 29 Sep 2020 05:22 )  WBC Count : 11.28 K/uL  RBC Count : 3.94 M/uL  Hemoglobin : 10.4 g/dL  Hematocrit : 32.3 %  Platelet Count - Automated : 22 K/uL  Mean Cell Volume : 82.0 fl  Mean Cell Hemoglobin : 26.4 pg  Mean Cell Hemoglobin Concentration : 32.2 gm/dL  Auto Neutrophil # : 9.72 K/uL  Auto Lymphocyte # : 1.31 K/uL  Auto Monocyte # : 0.11 K/uL  Auto Eosinophil # : 0.00 K/uL  Auto Basophil # : 0.03 K/uL  Auto Neutrophil % : 86.1 %  Auto Lymphocyte % : 11.6 %  Auto Monocyte % : 1.0 %  Auto Eosinophil % : 0.0 %  Auto Basophil % : 0.3 %      LIVER FUNCTIONS - ( 29 Sep 2020 05:22 )  Alb: 4.3 g/dL / Pro: 7.3 g/dL / ALK PHOS: 70 U/L / ALT: 22 U/L / AST: 35 U/L / GGT: x             PT/INR - ( 29 Sep 2020 05:22 )   PT: 13.3 sec;   INR: 1.12 ratio         PTT - ( 29 Sep 2020 05:22 )  PTT:30.0 sec    Fibrinogen Assay: 623 mg/dL (09-28-20 @ 23:49)  Lactate Dehydrogenase, Serum: 540 U/L (09-28-20 @ 20:56)             Tmax overnight 99.5.  Plt 22 this AM.  PLEX to be initiated this AM.    Vital Signs Last 24 Hrs  T(C): 36.8 (29 Sep 2020 08:00), Max: 37.5 (29 Sep 2020 00:57)  T(F): 98.2 (29 Sep 2020 08:00), Max: 99.5 (29 Sep 2020 00:57)  HR: 97 (29 Sep 2020 10:00) (74 - 116)  BP: 128/71 (29 Sep 2020 10:00) (117/60 - 156/87)  BP(mean): 94 (29 Sep 2020 10:00) (82 - 99)  RR: 22 (29 Sep 2020 10:00) (16 - 26)  SpO2: 99% (29 Sep 2020 10:00) (97% - 100%)  PHYSICAL EXAM:    GENERAL: NAD, AAOx3  HEAD:  NC/AT  EYES: EOMI, PERRLA, no scleral icterus  HEENT: Moist mucous membranes  LUNG: Clear to auscultation bilaterally; No rales, rhonchi, wheezing, or rubs  HEART: RRR; No murmurs, rubs, or gallops  ABDOMEN: +BS, ST/ND/NT  EXTREMITIES:  2+ Peripheral Pulses, No clubbing, cyanosis, or edema  LAD: no palpable adenopathy  09-29    138  |  105  |  16  ----------------------------<  156<H>  4.4   |  19<L>  |  0.67    Ca    10.0      29 Sep 2020 05:22  Phos  3.8     09-29  Mg     2.0     09-29    TPro  7.3  /  Alb  4.3  /  TBili  0.9  /  DBili  x   /  AST  35  /  ALT  22  /  AlkPhos  70  09-29      CBC Full  -  ( 29 Sep 2020 05:22 )  WBC Count : 11.28 K/uL  RBC Count : 3.94 M/uL  Hemoglobin : 10.4 g/dL  Hematocrit : 32.3 %  Platelet Count - Automated : 22 K/uL  Mean Cell Volume : 82.0 fl  Mean Cell Hemoglobin : 26.4 pg  Mean Cell Hemoglobin Concentration : 32.2 gm/dL  Auto Neutrophil # : 9.72 K/uL  Auto Lymphocyte # : 1.31 K/uL  Auto Monocyte # : 0.11 K/uL  Auto Eosinophil # : 0.00 K/uL  Auto Basophil # : 0.03 K/uL  Auto Neutrophil % : 86.1 %  Auto Lymphocyte % : 11.6 %  Auto Monocyte % : 1.0 %  Auto Eosinophil % : 0.0 %  Auto Basophil % : 0.3 %      LIVER FUNCTIONS - ( 29 Sep 2020 05:22 )  Alb: 4.3 g/dL / Pro: 7.3 g/dL / ALK PHOS: 70 U/L / ALT: 22 U/L / AST: 35 U/L / GGT: x             PT/INR - ( 29 Sep 2020 05:22 )   PT: 13.3 sec;   INR: 1.12 ratio         PTT - ( 29 Sep 2020 05:22 )  PTT:30.0 sec    Fibrinogen Assay: 623 mg/dL (09-28-20 @ 23:49)  Lactate Dehydrogenase, Serum: 540 U/L (09-28-20 @ 20:56)

## 2020-09-29 NOTE — PROGRESS NOTE ADULT - ASSESSMENT
24 F with hx of TTP (followed by Dr. Berman at [facility]) s/p 22 sessions of PLEX at [facility] in 2014 followed by 4 doses of weekly Rituxan presenting with platelet count of 21K concerning for relapse of TTP.     #Relapsed TTP:  - Patient was initially diagnosed at age 18 and was treated at [facility] with 22 sessions of PLEX followed by Rituxan by Dr. Berman at [facility]  - Plt 21 on admission  - Peripheral smear with 3-5 schistocytes per HPF  - [id number] level pending  - PLASMIC score of 7 indicating high risk   - blood bank on board, starting PLEX today 9/29  - s/p 2 u FFP   - s/p 1 dose of solumedrol 1 AM on 9/29. continue for 3 days  - check CBC w DIFF, retic count, CMP, LDH, haptoglobin daily.  - Avoid plt transfusion as it can be fatal in TTP  - Perform CTH if pt's neurological status changes, please monitor mental status closely    Brittnee Montero DO  Hematology/Oncology Fellow, PGY6  Pager: [phone]     24 F with hx of TTP (followed by Dr. Berman at [facility]) s/p 22 sessions of PLEX at [facility] in 2014 followed by 4 doses of weekly Rituxan presenting with platelet count of 21K concerning for relapse of TTP.     #Relapsed TTP:  - Patient was initially diagnosed at age 18 and was treated at [facility] with 22 sessions of PLEX followed by Rituximab  - Plt 21 on admission  - Peripheral smear with 3-5 schistocytes per HPF  - [id number] level pending  - PLASMIC score of 7 indicating high risk   - blood bank on board, starting PLEX today 9/29  - s/p 2 u FFP   - s/p 1 dose of solumedrol 1 AM on 9/29. continue for 3 days then will taper  - check CBC w DIFF, retic count, CMP, LDH, haptoglobin daily.  - Avoid plt transfusion as it can be fatal in TTP  - CTH negative for bleed  - plan for rituximab tomorrow AFTER PLEX 9/30.  consent in chart and chemo nurse aware.  discussed with patient and her mother at bedside.    Brittnee Montero DO  Hematology/Oncology Fellow, PGY6  Pager: [phone]

## 2020-09-29 NOTE — PROGRESS NOTE ADULT - SUBJECTIVE AND OBJECTIVE BOX
Austin Mcelroy MD PGY-1  Internal Medicine  Pager [phone] / [phone]     INTERVAL HPI / OVERNIGHT EVENTS:  24F PMHx of TTP (22 sessions of PLEX at [facility], high dose steroids, 4 doses Rituxan) p/w ecchymosis (spontaneous onset RUE ), petechiae (onset BLE ) , headaches, "seeing black dots," and fatigue. Patient's platelet count 21k, , and peripheral blood smear w/ schistocytes.     SUBJECTIVE: Patient seen and examined at bedside.     CONSTITUTIONAL: No weakness, fevers or chills  EYES/ENT: No visual changes;  No vertigo or throat pain   NECK: No pain or stiffness  RESPIRATORY: No cough, wheezing, hemoptysis; No shortness of breath  CARDIOVASCULAR: No chest pain or palpitations  GASTROINTESTINAL: No abdominal or epigastric pain. No nausea, vomiting, or hematemesis; No diarrhea or constipation. No melena or hematochezia.  GENITOURINARY: No dysuria, frequency or hematuria  NEUROLOGICAL: No numbness or weakness  SKIN: No itching, rashes    OBJECTIVE:    VITAL SIGNS:  ICU Vital Signs Last 24 Hrs  T(C): 36.8 (29 Sep 2020 04:00), Max: 37.5 (29 Sep 2020 00:57)  T(F): 98.2 (29 Sep 2020 04:00), Max: 99.5 (29 Sep 2020 00:57)  HR: 74 (29 Sep 2020 05:00) (74 - 116)  BP: 122/58 (29 Sep 2020 05:00) (119/54 - 156/87)  BP(mean): 84 (29 Sep 2020 05:00) (84 - 99)  ABP: --  ABP(mean): --  RR: 26 (29 Sep 2020 05:00) (16 - 26)  SpO2: 99% (29 Sep 2020 05:00) (98% - 100%)         @ 07:01  -   @ 05:41  --------------------------------------------------------  IN: 200 mL / OUT: 0 mL / NET: 200 mL      CAPILLARY BLOOD GLUCOSE     PHYSICAL EXAM:    General: NAD  HEENT: NC/AT; PERRL, clear conjunctiva  Neck: supple  Respiratory: CTA b/l  Cardiovascular: +S1/S2; RRR  Abdomen: soft, NT/ND; +BS x4  Extremities: WWP, 2+ peripheral pulses b/l; no LE edema  Skin: normal color and turgor; no rash  Neurological:    MEDICATIONS:  MEDICATIONS  (STANDING):  chlorhexidine 4% Liquid 1 Application(s) Topical <User Schedule>  influenza   Vaccine 0.5 milliLiter(s) IntraMuscular once  methylPREDNISolone sodium succinate IVPB 1000 milliGRAM(s) IV Intermittent daily  sodium chloride 0.9%. 1000 milliLiter(s) (100 mL/Hr) IV Continuous <Continuous>    MEDICATIONS  (PRN):      ALLERGIES:  Allergies    No Known Allergies    Intolerances     LABS:                        10.4   11 )-----------(        ( 29 Sep 2020 05:22 )             32.3     Hemoglobin: 10.4 g/dL ( @ 05:22)  Hemoglobin: 10.7 g/dL ( @ 23:50)  Hemoglobin: 11.4 g/dL ( @ 20:56)    CBC Full  -  ( 29 Sep 2020 05:22 )  WBC Count : 11.28 K/uL  RBC Count : 3.94 M/uL  Hemoglobin : 10.4 g/dL  Hematocrit : 32.3 %  Platelet Count - Automated : 22 K/uL  Mean Cell Volume : 82.0 fl  Mean Cell Hemoglobin : 26.4 pg  Mean Cell Hemoglobin Concentration : 32.2 gm/dL  Auto Neutrophil # : x  Auto Lymphocyte # : x  Auto Monocyte # : x  Auto Eosinophil # : x  Auto Basophil # : x  Auto Neutrophil % : x  Auto Lymphocyte % : x  Auto Monocyte % : x  Auto Eosinophil % : x  Auto Basophil % : x        138  |  102  |  20  ----------------------------<  97  4.1   |  22  |  0.87    Ca    10.1      28 Sep 2020 20:56    TPro  7.2  /  Alb  4.4  /  TBili  0.6  /  DBili  x   /  AST  33  /  ALT  20  /  AlkPhos  68      Creatinine Trend: 0.87<--  LIVER FUNCTIONS - ( 28 Sep 2020 20:56 )  Alb: 4.4 g/dL / Pro: 7.2 g/dL / ALK PHOS: 68 U/L / ALT: 20 U/L / AST: 33 U/L / GGT: x           PT/INR - ( 28 Sep 2020 20:56 )   PT: 12.7 sec;   INR: 1.07 ratio         PTT - ( 28 Sep 2020 20:56 )  PTT:33.3 sec    hs Troponin:      05:21 - VBG - pH: 7.38  | pCO2: 41    | pO2: 38    | Lactate: 1.9    20:53 - VBG - pH: 7.34  | pCO2: 47    | pO2: 20    | Lactate: 1.4        Urinalysis Basic - ( 28 Sep 2020 22:19 )    Color: Light Yellow / Appearance: Clear / S.015 / pH: x  Gluc: x / Ketone: Negative  / Bili: Negative / Urobili: Negative   Blood: x / Protein: 100 / Nitrite: Negative   Leuk Esterase: Negative / RBC: 10 /hpf / WBC 3 /HPF   Sq Epi: x / Non Sq Epi: 2 /hpf / Bacteria: Negative       EKG:       IMAGING:    CHEST XRAY  FINDINGS:  Clear lungs. No pleural effusion or pneumothorax. The cardiomediastinal silhouette is poorly evaluated on this projection. Degenerative changes of the osseous structures.    IMPRESSION:  Clear lungs.      Labs, imaging, EKG personally reviewed    RADIOLOGY & ADDITIONAL TESTS: Reviewed. Austin Mcelroy MD PGY-1  Internal Medicine  Pager [phone] / [phone]     INTERVAL HPI / OVERNIGHT EVENTS:  24F PMHx of TTP (22 sessions of PLEX at [facility], high dose steroids, 4 doses Rituxan) p/w ecchymosis (spontaneous onset RUE ), petechiae (onset BLE ) , headaches, "seeing black dots," and fatigue. Patient's platelet count 21k, , and peripheral blood smear w/ schistocytes.     SUBJECTIVE: Patient seen and examined at bedside. Sitting up in bed in NAD. Endorses mild headache. Patient confirms that she had "black spots" in her vision that waxed and waned, which she attributes to her sBP dropping ("it dropped from 140 to 100"). She states that these symptoms resolved.      OBJECTIVE:    VITAL SIGNS:  ICU Vital Signs Last 24 Hrs  T(C): 36.8 (29 Sep 2020 04:00), Max: 37.5 (29 Sep 2020 00:57)  T(F): 98.2 (29 Sep 2020 04:00), Max: 99.5 (29 Sep 2020 00:57)  HR: 74 (29 Sep 2020 05:00) (74 - 116)  BP: 122/58 (29 Sep 2020 05:00) (119/54 - 156/87)  BP(mean): 84 (29 Sep 2020 05:00) (84 - 99)  ABP: --  ABP(mean): --  RR: 26 (29 Sep 2020 05:00) (16 - 26)  SpO2: 99% (29 Sep 2020 05:00) (98% - 100%)         @ 07:01  -   @ 05:41  --------------------------------------------------------  IN: 200 mL / OUT: ____ mL / NET: 200 mL      CAPILLARY BLOOD GLUCOSE     PHYSICAL EXAM:    General: NAD  HEENT: NC/AT; PERRL, clear conjunctiva  Respiratory: CTA b/l  Cardiovascular: +S1/S2; regular rate, no m/r/g  Abdomen: soft, +BS x4  Extremities: WWP, 2+ peripheral pulses b/l  Skin: RUE ecchymosis, BLE petechiae   Neurological: A&Ox3, no FND    MEDICATIONS:  MEDICATIONS  (STANDING):  chlorhexidine 4% Liquid 1 Application(s) Topical <User Schedule>  influenza   Vaccine 0.5 milliLiter(s) IntraMuscular once  methylPREDNISolone sodium succinate IVPB 1000 milliGRAM(s) IV Intermittent daily  sodium chloride 0.9%. 1000 milliLiter(s) (100 mL/Hr) IV Continuous <Continuous>    MEDICATIONS  (PRN):      ALLERGIES:  Allergies    No Known Allergies    Intolerances     LABS:                        10.4   11.28 )-----------(        ( 29 Sep 2020 05:22 )             32.3     Hemoglobin: 10.4 g/dL ( @ 05:22)  Hemoglobin: 10.7 g/dL ( @ 23:50)  Hemoglobin: 11.4 g/dL ( @ 20:56)    CBC Full  -  ( 29 Sep 2020 05:22 )  WBC Count : 11.28 K/uL  RBC Count : 3.94 M/uL  Hemoglobin : 10.4 g/dL  Hematocrit : 32.3 %  Platelet Count - Automated : 22 K/uL  Mean Cell Volume : 82.0 fl  Mean Cell Hemoglobin : 26.4 pg  Mean Cell Hemoglobin Concentration : 32.2 gm/dL  Auto Neutrophil # : x  Auto Lymphocyte # : x  Auto Monocyte # : x  Auto Eosinophil # : x  Auto Basophil # : x  Auto Neutrophil % : x  Auto Lymphocyte % : x  Auto Monocyte % : x  Auto Eosinophil % : x  Auto Basophil % : x        138  |  102  |  20  ----------------------------<  97  4.1   |  22  |  0.87    Ca    10.1      28 Sep 2020 20:56    TPro  7.2  /  Alb  4.4  /  TBili  0.6  /  DBili  x   /  AST  33  /  ALT  20  /  AlkPhos  68      Creatinine Trend: 0.87<--  LIVER FUNCTIONS - ( 28 Sep 2020 20:56 )  Alb: 4.4 g/dL / Pro: 7.2 g/dL / ALK PHOS: 68 U/L / ALT: 20 U/L / AST: 33 U/L / GGT: x           PT/INR - ( 28 Sep 2020 20:56 )   PT: 12.7 sec;   INR: 1.07 ratio         PTT - ( 28 Sep 2020 20:56 )  PTT:33.3 sec    hs Troponin:      05:21 - VBG - pH: 7.38  | pCO2: 41    | pO2: 38    | Lactate: 1.9    20:53 - VBG - pH: 7.34  | pCO2: 47    | pO2: 20    | Lactate: 1.4        Urinalysis Basic - ( 28 Sep 2020 22:19 )    Color: Light Yellow / Appearance: Clear / S.015 / pH: x  Gluc: x / Ketone: Negative  / Bili: Negative / Urobili: Negative   Blood: x / Protein: 100 / Nitrite: Negative   Leuk Esterase: Negative / RBC: 10 /hpf / WBC 3 /HPF   Sq Epi: x / Non Sq Epi: 2 /hpf / Bacteria: Negative     IMAGING:    CHEST XRAY  FINDINGS:  Clear lungs. No pleural effusion or pneumothorax. The cardiomediastinal silhouette is poorly evaluated on this projection. Degenerative changes of the osseous structures.    IMPRESSION:  Clear lungs.      Labs, imaging, EKG personally reviewed    RADIOLOGY & ADDITIONAL TESTS: Reviewed.

## 2020-09-29 NOTE — H&P ADULT - NSICDXFAMILYHX_GEN_ALL_CORE_FT
FAMILY HISTORY:  Father  Still living? Yes, Estimated age: Age Unknown  Family history of coronary artery bypass graft, Age at diagnosis: Age Unknown

## 2020-09-29 NOTE — H&P ADULT - ATTENDING COMMENTS
I have personally provided 40 minutes of critical care time concurrently with the resident/fellow.  This time excludes time spent on separate procedures and time spent teaching.  I have reviewed the resident/fellow's documentation and I agree with the assessment and plan of care.     Patient seen and examined.  Agree with resident note as above.  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  Labs show thrombocytopenia with schistocytes.  Appreciate heme and blood bank involving - concerning for TTP relapse.  Will admit to MICU for urgent shiley and PLEX.  Pulse steroids.  FFP for now.  rest of plan as above and I have edited as appropriate.

## 2020-09-29 NOTE — CONSULT NOTE ADULT - SUBJECTIVE AND OBJECTIVE BOX
Patient is a 24y old  Female who presents with a chief complaint of ecchymosis and headaches. H/O of TTP in 2014, 22 sessions of plasma exchange at [facility], high dose steroids, and four doses of Rituxan. She complaints of headaches, fatigue and "seeing black dots".  Vital Signs Last 24 Hrs  T(C): 37.5 (29 Sep 2020 00:57), Max: 37.5 (29 Sep 2020 00:57)  T(F): 99.5 (29 Sep 2020 00:57), Max: 99.5 (29 Sep 2020 00:57)  HR: 97 (29 Sep 2020 00:57) (97 - 116)  BP: 156/87 (29 Sep 2020 00:57) (134/97 - 156/87)  BP(mean): --  RR: 17 (29 Sep 2020 00:57) (17 - 18)  SpO2: 100% (29 Sep 2020 00:57) (98% - 100%)  Allergies    No Known Allergies    Intolerances      PAST MEDICAL & SURGICAL HISTORY:  TTP (thrombotic thrombocytopenic purpura)    Iron deficiency    TTP (thrombotic thrombocytopenic purpura)    Cold sore    High cholesterol    S/P ankle arthrodesis    	                        10.7   12.81 )-----------( 21       ( 28 Sep 2020 23:50 )             33.1     PT/INR - ( 28 Sep 2020 20:56 )   PT: 12.7 sec;   INR: 1.07 ratio         PTT - ( 28 Sep 2020 20:56 )  PTT:33.3 sec    Lactate Dehydrogenase, Serum: 540 U/L (09-28 @ 20:56)    [id number] pending    09-28    138  |  102  |  20  ----------------------------<  97  4.1   |  22  |  0.87    Ca    10.1      28 Sep 2020 20:56    TPro  7.2  /  Alb  4.4  /  TBili  0.6  /  DBili  x   /  AST  33  /  ALT  20  /  AlkPhos  68  09-28            :    
REASON FOR CONSULTATION: Concern for relapsed TTP     HPI: 24 F with hx of TTP (followed by Dr. Berman at [facility]) s/p 22 sessions of PLEX at [facility] in  followed by 4 doses of weekly Rituxan and has been relapse free since then until now. Pt states that she came in today for worsening ecchymosis and petechiae on her lower extremities, upper extremities, chest and trunk. She states she currently has a headache and "am seeing dots in my vision." No fevers or any other complaints at this time. 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 Pt states that she has not seen Dr. Berman in 2 years. No overt bleeding noted by patient.     REVIEW OF SYSTEMS:    CONSTITUTIONAL: +Fatigue, no fevers or chills   EYES/ENT: + visual changes;  No vertigo or throat pain   NECK: No pain or stiffness  RESPIRATORY: No cough, wheezing, hemoptysis; No shortness of breath  CARDIOVASCULAR: No chest pain or palpitations  GASTROINTESTINAL: No abdominal or epigastric pain. No nausea, vomiting, or hematemesis; No diarrhea or constipation. No melena or hematochezia.  GENITOURINARY: No dysuria, frequency or hematuria  NEUROLOGICAL: No numbness or weakness  SKIN: +easy bruising, +petechiae    All other review of systems is negative unless indicated above.    Allergies    No Known Allergies    Intolerances        MEDICATIONS  (STANDING):  methylPREDNISolone sodium succinate IVPB 1000 milliGRAM(s) IV Intermittent daily  sodium chloride 0.9%. 1000 milliLiter(s) (100 mL/Hr) IV Continuous <Continuous>    MEDICATIONS  (PRN):      Vital Signs Last 24 Hrs  T(C): 37.2 (28 Sep 2020 20:25), Max: 37.2 (28 Sep 2020 20:25)  T(F): 98.9 (28 Sep 2020 20:25), Max: 98.9 (28 Sep 2020 20:25)  HR: 106 (28 Sep 2020 20:25) (106 - 116)  BP: 134/97 (28 Sep 2020 20:25) (134/97 - 139/96)  BP(mean): --  RR: 18 (28 Sep 2020 20:25) (18 - 18)  SpO2: 100% (28 Sep 2020 20:25) (98% - 100%)    PHYSICAL EXAM:    General: AOx3, morbidly obese, no acute distress  EYES: EOMI, PERRLA, conjunctiva and sclera clear  CHEST/LUNG: Clear to auscultation bilaterally; no wheezes, crackles or rales  HEART: Regular rate and rhythm; no murmurs  ABDOMEN: Soft, Nontender, Nondistended; BS+  LYMPH: No lymphadenopathy noted.  Extremities: +Ecchymosis, diffuse petechiae in lower extremities     LABS:                        10.7   12.81 )-----------( 21       ( 28 Sep 2020 23:50 )             33.1     09-    138  |  102  |  20  ----------------------------<  97  4.1   |  22  |  0.87    Ca    10.1      28 Sep 2020 20:56    TPro  7.2  /  Alb  4.4  /  TBili  0.6  /  DBili  x   /  AST  33  /  ALT  20  /  AlkPhos  68  -    PT/INR - ( 28 Sep 2020 20:56 )   PT: 12.7 sec;   INR: 1.07 ratio         PTT - ( 28 Sep 2020 20:56 )  PTT:33.3 sec  Urinalysis Basic - ( 28 Sep 2020 22:19 )    Color: Light Yellow / Appearance: Clear / S.015 / pH: x  Gluc: x / Ketone: Negative  / Bili: Negative / Urobili: Negative   Blood: x / Protein: 100 / Nitrite: Negative   Leuk Esterase: Negative / RBC: 10 /hpf / WBC 3 /HPF   Sq Epi: x / Non Sq Epi: 2 /hpf / Bacteria: Negative            RADIOLOGY & ADDITIONAL STUDIES:    PATHOLOGY:

## 2020-09-29 NOTE — PROGRESS NOTE ADULT - ATTENDING COMMENTS
25 y/o F with hx of TTP in 2014 followed at [facility] s/p 22 sessions of PLEX at [facility] in 2014 followed by 4 doses of weekly Rituxan at that time, now presenting with platelet count of 21K concerning for relapse of TTP. [id number] pending but 3-5 schistocytes seen on peripheral smear and labs consistent with hemolysis. Continue PLEX daily and steroid pulse. Plan for Rituxan in the AM, will plan for 4 weekly doses for relapsed TTP.

## 2020-09-29 NOTE — CHART NOTE - NSCHARTNOTEFT_GEN_A_CORE
MICU Transfer Note    Transfer from: MICU    Transfer to: ( x ) Medicine    (  ) Telemetry     (   ) RCU        (    ) Palliative         (   ) Stroke Unit          (   ) __________________    Accepting Physician:  Signout given to:     MICU COURSE: 24F PMHx of TTP (22 sessions of PLEX at [facility], high dose steroids, 4 doses Rituxan) p/w ecchymosis (spontaneous onset RUE 9/25), petechiae (onset BLE 9/27) , headaches, "seeing black dots," and fatigue. Patient's platelet count 21k, , and peripheral blood smear w/ schistocytes.   Pt admitted to MICU for urgent shiley and PLEX and received one PLEX session, after which pt was on bedrest for 2 hours. Pt sent for CTH non-con, which was negative. Pt also received 2 u FFP and started on 3 day course of pulse steroids today (9/29-).          ASSESSMENT & PLAN:     24F h/o TTP p/w bruising and petechiae, PLT<30, and PLASMIC score > 6 (high likelihood [id number] deficiency). Most likely TTP relapse. Admitted to the MICU for urgent Shiley and PLEX. In interim, receiving pulse steroids and FFP.      Neuro  - no active issues at this time.   - visual changes have since resolved  - will obtain CTH to r/o bleeds (given low platelets and recent visual changes)  - neuro checks q2H    Cardiovascular   - cardiovascularly stable  - no active interventions    Pulmonary  - on RA, no active issues    GI   - no active issues    Renal/electrolytes  - no active issues    Heme  TTP exacerbation  - s/p 2 units plasma, pulse steroid 3 day course, per Hem/Onc  - Shiley placement  - PLEX after: planned for 7:30am, per blood bank  - monitor anemia  - plan for rituximab tomorrow AFTER PLEX 9/30.  consent in chart and chemo nurse aware.  discussed with patient and her mother at bedside.    Endocrine  - q6 FSG while on pulse steroids    ID  Leukocytosis, likely reactive  -NTD    Ppx  DVT ppx: ambulatory at baseline  Diet: regular  Dispo: pending        FOR FOLLOW UP: MICU Transfer Note    Transfer from: MICU    Transfer to: ( x ) Medicine    (  ) Telemetry     (   ) RCU        (    ) Palliative         (   ) Stroke Unit          (   ) __________________    Accepting Physician:  Signout given to:     MICU COURSE: 24F PMHx of TTP (22 sessions of PLEX at [facility], high dose steroids, 4 doses Rituxan) p/w ecchymosis (spontaneous onset RUE 9/25), petechiae (onset BLE 9/27) , headaches, "seeing black dots," and fatigue. Patient's platelet count 21k, , and peripheral blood smear w/ schistocytes.   Pt admitted to MICU for urgent shiley and PLEX and received one PLEX session, after which pt was on bedrest for 2 hours. Pt sent for CTH non-con, which was negative. Pt also received 2 u FFP and started on 3 day course of pulse steroids today (9/29-).          ASSESSMENT & PLAN:     24F h/o TTP p/w bruising and petechiae, PLT<30, and PLASMIC score > 6 (high likelihood [id number] deficiency). Most likely TTP relapse. Admitted to the MICU for urgent Shiley and PLEX. In interim, receiving pulse steroids and FFP.      Neuro  - no active issues at this time.   - visual changes have since resolved  - CTH to r/o bleeds (given low platelets and recent visual changes), CTH neg  - neuro checks q4H    Cardiovascular   - cardiovascularly stable  - no active interventions    Pulmonary  - on RA, no active issues    GI   - no active issues    Renal/electrolytes  - no active issues    Heme  TTP exacerbation  - s/p 2 units plasma, pulse steroid 3 day course, s/p 1 dose of solumedrol 1 AM on 9/29. per Hem/Onc  - Shiley placement  - PLEX after: planned for 7:30am, per blood bank  - blood bank on board, starting PLEX today 9/29, continue PLEX qd until PLT >150 and then 2 days thereafter per heme/onc  - s/p 1 dose of solumedrol 1 AM on 9/29. continue for 3 days then will taper  - monitor anemia  - plan for rituximab tomorrow AFTER PLEX 9/30.  consent in chart and chemo nurse aware.  discussed with patient and her mother at bedside.    Endocrine  - q6 FSG while on pulse steroids    ID  Leukocytosis, likely reactive  -NTD    Ppx  DVT ppx: ambulatory at baseline  Diet: regular  Dispo: pending        FOR FOLLOW UP:    [ ] check CBC w DIFF, retic count, CMP, LDH, haptoglobin daily.  [ ] f/u HIV and Hep B core Ab, total per heme/onc  [ ] continue PLEX qd until PLT >150 and then 2 days thereafter per heme/onc  [ ] Avoid plt transfusion as it can be fatal in TTP MICU Transfer Note    Transfer from: MICU    Transfer to: ( x ) Medicine    (  ) Telemetry     (   ) RCU        (    ) Palliative         (   ) Stroke Unit          (   ) __________________    Accepting Physician: Dr. Tiesha Clements  Signout given to: Dr. Tiesha Clements    [facility] (22 sessions of PLEX at [facility], high dose steroids, 4 doses Rituxan) p/w ecchymosis (spontaneous onset RUE 9/25), petechiae (onset BLE 9/27) , headaches, "seeing black dots," and fatigue. Patient's platelet count 21k, , and peripheral blood smear w/ schistocytes.   Pt admitted to MICU for urgent shiley and PLEX and received one PLEX session, after which pt was on bedrest for 2 hours. Pt sent for CTH non-con, which was negative. Pt also received 2 u FFP and started on 3 day course of pulse steroids today (9/29-).          ASSESSMENT & PLAN:     24F h/o TTP p/w bruising and petechiae, PLT<30, and PLASMIC score > 6 (high likelihood [id number] deficiency). Most likely TTP relapse. Admitted to the MICU for urgent Shiley and PLEX. In interim, receiving pulse steroids and FFP.      Neuro  - no active issues at this time.   - visual changes have since resolved  - CTH to r/o bleeds (given low platelets and recent visual changes), CTH neg  - neuro checks q4H    Cardiovascular   - cardiovascularly stable  - no active interventions    Pulmonary  - on RA, no active issues    GI   - no active issues    Renal/electrolytes  - no active issues    Heme  TTP exacerbation  - s/p 2 units plasma, pulse steroid 3 day course, s/p 1 dose of solumedrol 1 AM on 9/29. per Hem/Onc  - Shiley placement  - PLEX after: planned for 7:30am, per blood bank  - blood bank on board, starting PLEX today 9/29, continue PLEX qd until PLT >150 and then 2 days thereafter per heme/onc  - s/p 1 dose of solumedrol 1 AM on 9/29. continue for 3 days then will taper  - monitor anemia  - plan for rituximab tomorrow AFTER PLEX 9/30.  consent in chart and chemo nurse aware.  discussed with patient and her mother at bedside.    Endocrine  - q6 FSG while on pulse steroids    ID  Leukocytosis, likely reactive  -NTD    Ppx  DVT ppx: ambulatory at baseline  Diet: regular  Dispo: pending        FOR FOLLOW UP:    [ ] check CBC w DIFF, retic count, CMP, LDH, haptoglobin daily.  [ ] f/u HIV and Hep B core Ab, total per heme/onc  [ ] continue PLEX qd until PLT >150 and then 2 days thereafter per heme/onc  [ ] Avoid plt transfusion as it can be fatal in TTP

## 2020-09-29 NOTE — H&P ADULT - NSHPPHYSICALEXAM_GEN_ALL_CORE
Vital Signs Last 24 Hrs  T(C): 37.5 (29 Sep 2020 00:57), Max: 37.5 (29 Sep 2020 00:57)  T(F): 99.5 (29 Sep 2020 00:57), Max: 99.5 (29 Sep 2020 00:57)  HR: 97 (29 Sep 2020 00:57) (97 - 116)  BP: 156/87 (29 Sep 2020 00:57) (134/97 - 156/87)  BP(mean): --  RR: 17 (29 Sep 2020 00:57) (17 - 18)  SpO2: 100% (29 Sep 2020 00:57) (98% - 100%)    PHYSICAL EXAM:  GENERAL: NAD  HEAD:  Atraumatic, Normocephalic  EYES: conjunctiva and sclera clear  HENT: No tonsillar erythema, exudates, or enlargement; Moist mucous membranes  NECK: Supple, No JVD, Normal thyroid  HEART: Regular rate and rhythm; No murmurs, rubs, or gallops  RESPIRATORY: CTA B/L, No W/R/R  ABDOMEN: Soft, Nontender, Nondistended; Bowel sounds present  NEUROLOGY: A&Ox3, nonfocal, moving all extremities  EXTREMITIES:  2+ Peripheral Pulses, No clubbing, cyanosis, or edema, Symmetric lower extremities  SKIN: warm, dry, normal color, no rash or abnormal lesions Vital Signs Last 24 Hrs  T(C): 37.5 (29 Sep 2020 00:57), Max: 37.5 (29 Sep 2020 00:57)  T(F): 99.5 (29 Sep 2020 00:57), Max: 99.5 (29 Sep 2020 00:57)  HR: 97 (29 Sep 2020 00:57) (97 - 116)  BP: 156/87 (29 Sep 2020 00:57) (134/97 - 156/87)  BP(mean): --  RR: 17 (29 Sep 2020 00:57) (17 - 18)  SpO2: 100% (29 Sep 2020 00:57) (98% - 100%)    PHYSICAL EXAM:  GENERAL: NAD  HEAD:  Atraumatic, Normocephalic  EYES: conjunctiva and sclera clear  HENT: No tonsillar erythema, exudates, or enlargement; Moist mucous membranes  NECK: Supple, No JVD, Normal thyroid  HEART: Regular rate and rhythm; No murmurs, rubs, or gallops  RESPIRATORY: CTA B/L, No W/R/R  ABDOMEN: Soft, Nontender, Nondistended; Bowel sounds present  NEUROLOGY: A&Ox3, nonfocal, moving all extremities  EXTREMITIES:  2+ Peripheral Pulses, + petechiae bilaterally  SKIN: arm w/ purpura

## 2020-09-30 DIAGNOSIS — M87.00 IDIOPATHIC ASEPTIC NECROSIS OF UNSPECIFIED BONE: ICD-10-CM

## 2020-09-30 DIAGNOSIS — Z29.9 ENCOUNTER FOR PROPHYLACTIC MEASURES, UNSPECIFIED: ICD-10-CM

## 2020-09-30 DIAGNOSIS — M31.1 THROMBOTIC MICROANGIOPATHY: ICD-10-CM

## 2020-09-30 LAB
ALBUMIN SERPL ELPH-MCNC: 3.8 G/DL — SIGNIFICANT CHANGE UP (ref 3.3–5)
ALP SERPL-CCNC: 50 U/L — SIGNIFICANT CHANGE UP (ref 40–120)
ALT FLD-CCNC: 16 U/L — SIGNIFICANT CHANGE UP (ref 10–45)
ANION GAP SERPL CALC-SCNC: 12 MMOL/L — SIGNIFICANT CHANGE UP (ref 5–17)
APTT BLD: 25.1 SEC — LOW (ref 27.5–35.5)
AST SERPL-CCNC: 16 U/L — SIGNIFICANT CHANGE UP (ref 10–40)
BILIRUB SERPL-MCNC: 0.2 MG/DL — SIGNIFICANT CHANGE UP (ref 0.2–1.2)
BUN SERPL-MCNC: 14 MG/DL — SIGNIFICANT CHANGE UP (ref 7–23)
CA-I BLD-SCNC: 1.17 MMOL/L — SIGNIFICANT CHANGE UP (ref 1.12–1.3)
CALCIUM SERPL-MCNC: 9.8 MG/DL — SIGNIFICANT CHANGE UP (ref 8.4–10.5)
CHLORIDE SERPL-SCNC: 103 MMOL/L — SIGNIFICANT CHANGE UP (ref 96–108)
CO2 SERPL-SCNC: 23 MMOL/L — SIGNIFICANT CHANGE UP (ref 22–31)
CREAT SERPL-MCNC: 0.74 MG/DL — SIGNIFICANT CHANGE UP (ref 0.5–1.3)
GLUCOSE BLDC GLUCOMTR-MCNC: 155 MG/DL — HIGH (ref 70–99)
GLUCOSE SERPL-MCNC: 189 MG/DL — HIGH (ref 70–99)
HAPTOGLOB SERPL-MCNC: 88 MG/DL — SIGNIFICANT CHANGE UP (ref 34–200)
HAV IGM SER-ACNC: SIGNIFICANT CHANGE UP
HBV CORE AB SER-ACNC: SIGNIFICANT CHANGE UP
HBV CORE IGM SER-ACNC: SIGNIFICANT CHANGE UP
HBV SURFACE AG SER-ACNC: SIGNIFICANT CHANGE UP
HCT VFR BLD CALC: 32.9 % — LOW (ref 34.5–45)
HCV AB S/CO SERPL IA: 0.09 S/CO — SIGNIFICANT CHANGE UP (ref 0–0.99)
HCV AB SERPL-IMP: SIGNIFICANT CHANGE UP
HGB BLD-MCNC: 10.9 G/DL — LOW (ref 11.5–15.5)
HIV 1+2 AB+HIV1 P24 AG SERPL QL IA: SIGNIFICANT CHANGE UP
INR BLD: 1.07 RATIO — SIGNIFICANT CHANGE UP (ref 0.88–1.16)
LDH SERPL L TO P-CCNC: 252 U/L — HIGH (ref 50–242)
MAGNESIUM SERPL-MCNC: 1.8 MG/DL — SIGNIFICANT CHANGE UP (ref 1.6–2.6)
MCHC RBC-ENTMCNC: 27.2 PG — SIGNIFICANT CHANGE UP (ref 27–34)
MCHC RBC-ENTMCNC: 33.1 GM/DL — SIGNIFICANT CHANGE UP (ref 32–36)
MCV RBC AUTO: 82 FL — SIGNIFICANT CHANGE UP (ref 80–100)
NRBC # BLD: 0 /100 WBCS — SIGNIFICANT CHANGE UP (ref 0–0)
PHOSPHATE SERPL-MCNC: 3 MG/DL — SIGNIFICANT CHANGE UP (ref 2.5–4.5)
PLATELET # BLD AUTO: 71 K/UL — LOW (ref 150–400)
POTASSIUM SERPL-MCNC: 3.8 MMOL/L — SIGNIFICANT CHANGE UP (ref 3.5–5.3)
POTASSIUM SERPL-SCNC: 3.8 MMOL/L — SIGNIFICANT CHANGE UP (ref 3.5–5.3)
PROT SERPL-MCNC: 6.2 G/DL — SIGNIFICANT CHANGE UP (ref 6–8.3)
PROTHROM AB SERPL-ACNC: 12.8 SEC — SIGNIFICANT CHANGE UP (ref 10.6–13.6)
RBC # BLD: 4.01 M/UL — SIGNIFICANT CHANGE UP (ref 3.8–5.2)
RBC # BLD: 4.01 M/UL — SIGNIFICANT CHANGE UP (ref 3.8–5.2)
RBC # FLD: 15.6 % — HIGH (ref 10.3–14.5)
RETICS #: 107.1 K/UL — SIGNIFICANT CHANGE UP (ref 25–125)
RETICS/RBC NFR: 2.7 % — HIGH (ref 0.5–2.5)
SODIUM SERPL-SCNC: 138 MMOL/L — SIGNIFICANT CHANGE UP (ref 135–145)
WBC # BLD: 18.8 K/UL — HIGH (ref 3.8–10.5)
WBC # FLD AUTO: 18.8 K/UL — HIGH (ref 3.8–10.5)

## 2020-09-30 PROCEDURE — 99233 SBSQ HOSP IP/OBS HIGH 50: CPT | Mod: GC

## 2020-09-30 PROCEDURE — 36514 APHERESIS PLASMA: CPT

## 2020-09-30 PROCEDURE — 99233 SBSQ HOSP IP/OBS HIGH 50: CPT

## 2020-09-30 RX ORDER — HYDROCORTISONE 20 MG
100 TABLET ORAL ONCE
Refills: 0 | Status: DISCONTINUED | OUTPATIENT
Start: 2020-09-30 | End: 2020-10-07

## 2020-09-30 RX ORDER — ACETAMINOPHEN 500 MG
650 TABLET ORAL ONCE
Refills: 0 | Status: COMPLETED | OUTPATIENT
Start: 2020-09-30 | End: 2020-09-30

## 2020-09-30 RX ORDER — RITUXIMAB 10 MG/ML
900 INJECTION, SOLUTION INTRAVENOUS ONCE
Refills: 0 | Status: COMPLETED | OUTPATIENT
Start: 2020-09-30 | End: 2020-09-30

## 2020-09-30 RX ORDER — DIPHENHYDRAMINE HCL 50 MG
25 CAPSULE ORAL ONCE
Refills: 0 | Status: COMPLETED | OUTPATIENT
Start: 2020-09-30 | End: 2020-09-30

## 2020-09-30 RX ADMIN — RITUXIMAB 150 MILLIGRAM(S): 10 INJECTION, SOLUTION INTRAVENOUS at 14:06

## 2020-09-30 RX ADMIN — Medication 58 MILLIGRAM(S): at 05:01

## 2020-09-30 RX ADMIN — CHLORHEXIDINE GLUCONATE 1 APPLICATION(S): 213 SOLUTION TOPICAL at 05:04

## 2020-09-30 RX ADMIN — Medication 650 MILLIGRAM(S): at 13:29

## 2020-09-30 RX ADMIN — Medication 25 MILLIGRAM(S): at 13:30

## 2020-09-30 NOTE — CHART NOTE - NSCHARTNOTEFT_GEN_A_CORE
MAR Accept Note  Transfer to: Medicine    Accepting Attending Physician:  Dr. Lopez  Assigned Room:  [address] [place], [place] [address]    Patient seen and examined.   Labs and data reviewed.   No findings precluding transfer of service.       HPI/MICU COURSE:   Please refer to MICU transfer note for full details. Briefly, this is a 4F PMHx of TTP (22 sessions of PLEX at [facility], high dose steroids, 4 doses Rituxan) p/w ecchymosis (spontaneous onset RUE 9/25), petechiae (onset BLE 9/27) , headaches, "seeing black dots," and fatigue. Patient's platelet count 21k, , and peripheral blood smear w/ schistocytes.   Pt admitted to MICU for urgent shiley and PLEX and received one PLEX session, after which pt was on bedrest for 2 hours. Pt sent for CTH non-con, which was negative. Pt also received 2 u FFP and started on 3 day course of pulse steroids today (9/29-).    Neuro  - no active issues at this time.   - visual changes have since resolved  - CTH to r/o bleeds (given low platelets and recent visual changes), CTH neg  - neuro checks q4H    Cardiovascular   - cardiovascularly stable  - no active interventions    Pulmonary  - on RA, no active issues    GI   - no active issues    Renal/electrolytes  - no active issues    Heme  TTP exacerbation  - s/p 2 units plasma, pulse steroid 3 day course, s/p 1 dose of solumedrol 1 AM on 9/29. per Hem/Onc  - Shiley placement  - PLEX after: planned for 7:30am, per blood bank  - blood bank on board, starting PLEX today 9/29, continue PLEX qd until PLT >150 and then 2 days thereafter per heme/onc  - s/p 1 dose of solumedrol 1 AM on 9/29. continue for 3 days then will taper  - monitor anemia  - plan for rituximab tomorrow AFTER PLEX 9/30.  consent in chart and chemo nurse aware.  discussed with patient and her mother at bedside.    Endocrine  - q6 FSG while on pulse steroids    ID  Leukocytosis, likely reactive  -NTD    Ppx  DVT ppx: ambulatory at baseline  Diet: regular  Dispo: pending    FOR FOLLOW-UP:    FOR FOLLOW UP:    [ ] check CBC w DIFF, retic count, CMP, LDH, haptoglobin daily.  [ ] f/u HIV and Hep B core Ab, total per heme/onc  [ ] continue PLEX qd until PLT >150 and then 2 days thereafter per heme/onc  [ ] Avoid plt transfusion as it can be fatal in TTP.    Nhan Dewitt PGY3

## 2020-09-30 NOTE — PROGRESS NOTE ADULT - SUBJECTIVE AND OBJECTIVE BOX
[facility] Division of Hospital Medicine  John Walters MD  Pager (M-F, 8A-5P): [phone]  Other Times:  [phone]    Patient is a 24y old  Female who presents with a chief complaint of TTP exacerbation (29 Sep 2020 10:43)      SUBJECTIVE / OVERNIGHT EVENTS: no new complaints, out of ICU after Plex  ADDITIONAL REVIEW OF SYSTEMS:    MEDICATIONS  (STANDING):  acetaminophen   Tablet .. 650 milliGRAM(s) Oral once  chlorhexidine 4% Liquid 1 Application(s) Topical <User Schedule>  diphenhydrAMINE   Injectable 25 milliGRAM(s) IV Push once  influenza   Vaccine 0.5 milliLiter(s) IntraMuscular once  methylPREDNISolone sodium succinate IVPB 1000 milliGRAM(s) IV Intermittent daily  riTUXimab-pvvr (RUXIENCE) IVPB (eMAR) 900 milliGRAM(s) IV Intermittent once    MEDICATIONS  (PRN):  hydrocortisone sodium succinate Injectable 100 milliGRAM(s) IV Push once PRN rituximab reaction  sodium chloride 0.9% lock flush 10 milliLiter(s) IV Push every 1 hour PRN Pre/post blood products, medications, blood draw, and to maintain line patency      CAPILLARY BLOOD GLUCOSE      POCT Blood Glucose.: 155 mg/dL (30 Sep 2020 09:07)  POCT Blood Glucose.: 197 mg/dL (29 Sep 2020 22:01)  POCT Blood Glucose.: 205 mg/dL (29 Sep 2020 16:48)    I&O's Summary    29 Sep 2020 07:  -  30 Sep 2020 07:00  --------------------------------------------------------  IN: 560 mL / OUT: 1900 mL / NET: -1340 mL    30 Sep 2020 07:  -  30 Sep 2020 13:19  --------------------------------------------------------  IN: 320 mL / OUT: 0 mL / NET: 320 mL        PHYSICAL EXAM:  Vital Signs Last 24 Hrs  T(C): 37.1 (30 Sep 2020 13:00), Max: 37.1 (30 Sep 2020 06:41)  T(F): 98.7 (30 Sep 2020 13:00), Max: 98.8 (30 Sep 2020 06:41)  HR: 79 (30 Sep 2020 13:00) (68 - 106)  BP: 114/76 (30 Sep 2020 13:00) (101/56 - 145/61)  BP(mean): 81 (30 Sep 2020 06:15) (73 - 93)  RR: 20 (30 Sep 2020 13:00) (19 - 33)  SpO2: 96% (30 Sep 2020 13:00) (96% - 99%)  CONSTITUTIONAL: NAD, well-developed, well-groomed  EYES: conjunctiva and sclera clear  ENMT: Moist oral mucosa, no pharyngeal injection or exudates; normal dentition  NECK: Supple, no palpable masses; no thyromegaly R shiley receiving PLEX  RESPIRATORY: Normal respiratory effort; lungs are clear to auscultation bilaterally  CARDIOVASCULAR: Regular rate and rhythm, no murmur; No lower extremity edema; Peripheral pulses are 2+ bilaterally  ABDOMEN: Nontender to palpation, normoactive bowel sounds, no rebound/guarding; No hepatosplenomegaly  PSYCH: calm  NEUROLOGY: AOx3 nonfocal  SKIN: + bruising L arm, legs    LABS:                        10.9   18.80 )-----------( 71       ( 30 Sep 2020 01:44 )             32.9     09-30    138  |  103  |  14  ----------------------------<  189<H>  3.8   |  23  |  0.74    Ca    9.8      30 Sep 2020 01:10  Phos  3.0     09-30  Mg     1.8     09-30    TPro  6.2  /  Alb  3.8  /  TBili  0.2  /  DBili  x   /  AST  16  /  ALT  16  /  AlkPhos  50  09-30    PT/INR - ( 30 Sep 2020 01:29 )   PT: 12.8 sec;   INR: 1.07 ratio         PTT - ( 30 Sep 2020 01:29 )  PTT:25.1 sec      Urinalysis Basic - ( 28 Sep 2020 22:19 )    Color: Light Yellow / Appearance: Clear / S.015 / pH: x  Gluc: x / Ketone: Negative  / Bili: Negative / Urobili: Negative   Blood: x / Protein: 100 / Nitrite: Negative   Leuk Esterase: Negative / RBC: 10 /hpf / WBC 3 /HPF   Sq Epi: x / Non Sq Epi: 2 /hpf / Bacteria: Negative    RADIOLOGY & ADDITIONAL TESTS:  Results Reviewed:   Imaging Personally Reviewed: CTH neg  Electrocardiogram Personally Reviewed:    COORDINATION OF CARE:  Care Discussed with Consultants/Other Providers [Y/N]:  Prior or Outpatient Records Reviewed [Y/N]:

## 2020-09-30 NOTE — PROGRESS NOTE ADULT - SUBJECTIVE AND OBJECTIVE BOX
Patient is a 24y old  Female who presents with a chief complaint of TTP exacerbation (30 Sep 2020 13:18)      HPI:  23yo F hx of TTP s/p 22 sessions of PLEX at [facility] in 2014 followed by 4 doses of weekly Rituximab, presented to ED with HA, black spots in her vision, petechia/ecchymosis in B/L LE and arms. Platelet count 21 on presentation, peripheral smear with 3-5 schistocytes per HPF, , haptoglobin <20, normal coags and renal function. Started on IV solumedrol and PLEX for TTP relapse.    Patient evaluated at bedside. s/p PLEX#1 on 9/29, allergic reaction during procedure with hives on face and left arm, resolved with Benadryl. No acute events overnight. Headache resolved. No longer seeing black spots. Platelet count improved to 71 today, . [id number] assay pending results. Denies fever, numbness, CP, SOB, cough, GI or  changes.      PAST MEDICAL & SURGICAL HISTORY:  TTP (thrombotic thrombocytopenic purpura)  Iron deficiency  Cold sore  High cholesterol  S/P ankle arthrodesis      MEDICATIONS  (STANDING):  chlorhexidine 4% Liquid 1 Application(s) Topical <User Schedule>  influenza   Vaccine 0.5 milliLiter(s) IntraMuscular once  methylPREDNISolone sodium succinate IVPB 1000 milliGRAM(s) IV Intermittent daily    MEDICATIONS  (PRN):  hydrocortisone sodium succinate Injectable 100 milliGRAM(s) IV Push once PRN rituximab reaction  sodium chloride 0.9% lock flush 10 milliLiter(s) IV Push every 1 hour PRN Pre/post blood products, medications, blood draw, and to maintain line patency      Allergies  No Known Allergies      REVIEW OF SYSTEMS:  CONSTITUTIONAL: + fatigue, No fevers or chills  EYES/ENT: See HPI, No vertigo or throat pain   RESPIRATORY: No cough, wheezing, hemoptysis, No shortness of breath  CARDIOVASCULAR: No chest pain or palpitations  GASTROINTESTINAL: No ,pain. No nausea, vomiting, or hematemesis; No diarrhea or constipation. No melena or hematochezia  GENITOURINARY: No pain, No hematuria  MUSCULOSKELETAL: No joint or muscle pain  NEUROLOGICAL: See HPI  HEMATOLOGY: See HPI  SKIN: See HPI, No itching, burning, rashes  ENDOCRINE: No cold or heat intolerance      Vital Signs Last 24 Hrs  T(C): 36.8 (30 Sep 2020 14:39), Max: 37.1 (30 Sep 2020 06:41)  T(F): 98.3 (30 Sep 2020 14:39), Max: 98.8 (30 Sep 2020 06:41)  HR: 83 (30 Sep 2020 14:39) (68 - 106)  BP: 120/83 (30 Sep 2020 14:39) (101/56 - 145/61)  BP(mean): 81 (30 Sep 2020 06:15) (73 - 93)  RR: 18 (30 Sep 2020 14:39) (18 - 33)  SpO2: 97% (30 Sep 2020 14:39) (96% - 99%)                          10.9   18.80 )-----------( 71       ( 30 Sep 2020 01:44 )             32.9     Reticulocyte Percent: 2.7 % (09-30 @ 01:44)  Reticulocyte Percent: 2.6 % (09-28 @ 23:50)    Hematocrit: 32.9 % (09-30 @ 01:44)  Hematocrit: 32.3 % (09-29 @ 05:22)  Hematocrit: 33.1 % (09-28 @ 23:50)  Hematocrit: 35.2 % (09-28 @ 20:56)    09-30    138  |  103  |  14  ----------------------------<  189<H>  3.8   |  23  |  0.74    Ca    9.8      30 Sep 2020 01:10  Phos  3.0     09-30  Mg     1.8     09-30    TPro  6.2  /  Alb  3.8  /  TBili  0.2  /  DBili  x   /  AST  16  /  ALT  16  /  AlkPhos  50  09-30    Lactate Dehydrogenase, Serum: 252 U/L (09-30 @ 01:10)  Lactate Dehydrogenase, Serum: 540 U/L (09-28 @ 20:56)    Haptoglobin, Serum: 88 mg/dL (09-30 @ 07:23)  Haptoglobin, Serum: <20 mg/dL (09-29 @ 01:51)    PT/INR - ( 30 Sep 2020 01:29 )   PT: 12.8 sec;   INR: 1.07 ratio    PTT - ( 30 Sep 2020 01:29 )  PTT:25.1 sec  Fibrinogen Assay: 623 mg/dL (09-28 @ 23:49)

## 2020-09-30 NOTE — PROGRESS NOTE ADULT - SUBJECTIVE AND OBJECTIVE BOX
INTERVAL HPI/OVERNIGHT EVENTS:  Patient S&E at bedside. No o/n events, patient tired during interview as she just received benadryl.     PAST MEDICAL & SURGICAL HISTORY:  TTP (thrombotic thrombocytopenic purpura)    Iron deficiency    TTP (thrombotic thrombocytopenic purpura)    Cold sore    High cholesterol    S/P ankle arthrodesis        FAMILY HISTORY:  Family history of coronary artery bypass graft (Father)        REVIEW OF SYSTEMS  General: No fevers, no chills, no fatigue, no weight loss  Skin: No rash  ENMT: No sore throat, no dysphagia, no mouth sores	  Respiratory and Thorax: No dyspnea, no cough, no wheezing  Cardiovascular: No chest pain, no palpitations  Gastrointestinal:	No N/V/D, no abdominal pain  Genitourinary: No dysuria  Musculoskeletal:	No joint pain, no muscle pain  Neurological:	No dizziness, no neuropathy  Psychiatric: No depression or anxiety  Hematology/Lymphatics: No easy bleeding or bruising, no swollen nodes noticed.       VITAL SIGNS:  T(F): 98.7 (20 @ 13:00)  HR: 79 (20 @ :00)  BP: 114/76 (20 @ 13:00)  RR: 20 (20 @ 13:00)  SpO2: 96% (20 @ :00)  Wt(kg): --    PHYSICAL EXAM:    Constitutional: NAD, obese  Eyes: EOMI, sclera non-icteric  Neck: supple, no masses, no JVD, shiley in place and intact  Respiratory: CTA b/l, good air entry b/l  Cardiovascular: RRR, no M/R/G  Gastrointestinal: soft, NTND, no masses palpable, + BS, no hepatosplenomegaly  Extremities: no c/c/e  Neurological: AAOx3      MEDICATIONS  (STANDING):  chlorhexidine 4% Liquid 1 Application(s) Topical <User Schedule>  influenza   Vaccine 0.5 milliLiter(s) IntraMuscular once  methylPREDNISolone sodium succinate IVPB 1000 milliGRAM(s) IV Intermittent daily  riTUXimab-pvvr (RUXIENCE) IVPB (eMAR) 900 milliGRAM(s) IV Intermittent once    MEDICATIONS  (PRN):  sodium chloride 0.9% lock flush 10 milliLiter(s) IV Push every 1 hour PRN Pre/post blood products, medications, blood draw, and to maintain line patency      Allergies    No Known Allergies    Intolerances        LABS:                        10.9   18.80 )-----------( 71       ( 30 Sep 2020 01:44 )             32.9     -    138  |  103  |  14  ----------------------------<  189<H>  3.8   |  23  |  0.74    Ca    9.8      30 Sep 2020 01:10  Phos  3.0       Mg     1.8         TPro  6.2  /  Alb  3.8  /  TBili  0.2  /  DBili  x   /  AST  16  /  ALT  16  /  AlkPhos  50  09-30    PT/INR - ( 30 Sep 2020 01:29 )   PT: 12.8 sec;   INR: 1.07 ratio         PTT - ( 30 Sep 2020 01:29 )  PTT:25.1 sec  Urinalysis Basic - ( 28 Sep 2020 22:19 )    Color: Light Yellow / Appearance: Clear / S.015 / pH: x  Gluc: x / Ketone: Negative  / Bili: Negative / Urobili: Negative   Blood: x / Protein: 100 / Nitrite: Negative   Leuk Esterase: Negative / RBC: 10 /hpf / WBC 3 /HPF   Sq Epi: x / Non Sq Epi: 2 /hpf / Bacteria: Negative        RADIOLOGY & ADDITIONAL TESTS:  Studies reviewed.

## 2020-09-30 NOTE — ADVANCED PRACTICE NURSE CONSULT - ASSESSMENT
Patient received in bed, alert and oriented x 4 capable of expressing all needs to staff. Cycle 1, day 1/1,Height and weight verified.  Consent in chart. Lab results as per Md mala aware of same. Vital signs stable prior to chemotherapy and  within acceptable parameters, see sunrise. Pt educated on the importance of saving urine, verbalizes good understanding. Pt education done re chemo regimen, drug effects and potential side effects, written materials provided, pt states understanding. Reports that she had Rituxan six years ago and tolerated well without side effects. Pt with right arm peripheral  line, site free from signs and symptoms of infection, good blood return obtained. Pre- medicated with Patient received in bed, alert and oriented x 4 capable of expressing all needs to staff. Cycle 1, day 1/1,Height and weight verified.  Consent in chart. Lab results as per Md mala aware of same. Vital signs stable prior to chemotherapy and  within acceptable parameters, see sunrise. Pt educated on the importance of saving urine, verbalizes good understanding. Pt education done re chemo regimen, drug effects and potential side effects, written materials provided, pt states understanding. Reports that she had Rituxan six years ago and tolerated well without side effects. Pt with right arm peripheral  line, site free from signs and symptoms of infection, good blood return obtained. Pre- medicated with Tylenol 650mg PO and Benadryl 25mg IVP. This was followed by Rituximab 375mg/[id number] IVSS. Same started as per first time protocol at 50ml/hr, increased by 50mg every 30 minutes to maximum rate  of 400ml/hr. Writer remained with patient for the first hour of treatment. Completed infusion without signs and symptoms of adverse reactions noted.

## 2020-09-30 NOTE — PROGRESS NOTE ADULT - ASSESSMENT
23yo F hx of TTP s/p 22 sessions of PLEX at [facility] in 2014 followed by 4 doses of weekly Rituximab, presented to ED with HA, black spots in her vision, petechia and ecchymosis. Platelet count 21 on presentation, peripheral smear with 3-5 schistocytes per HPF, , haptoglobin <20, normal coags and renal function. Started on IV solumedrol and PLEX for TTP relapse..      s/p PLEX#1 on 9/29, allergic reaction during procedure with hives on face and left arm, resolved with Benadryl.     HA and visual changes have resolved, Platelet count improved to 71 today, . [id number] assay pending results.    PLEX#2 performed today, 1 plasma volume with plasma as replacement fluid and premedication with benadryl. Procedure tolerated well.    Heme planning to give rituximab today after PLEX, timing of next PLEX to be determined- Will wait 24 hours after rituximab completed.    Current plan is daily PLEX until platelet count >150,000 for 2-3 consecutive days.    Please follow CBC and LDH daily.

## 2020-09-30 NOTE — PROGRESS NOTE ADULT - ASSESSMENT
24F w hx of TTP (22 sessions of PLEX at [facility], high dose steroids, 4 doses Rituxan in 2014) p/w ecchymosis (spontaneous onset RUE 9/25), petechiae (onset BLE 9/27) , headaches, "seeing black dots," and fatigue. Patient's platelet count 21k, , and peripheral blood smear w/ schistocytes. Pt admitted to MICU for urgent shiley and PLEX and received one PLEX session, after which pt was on bedrest for 2 hours. Pt sent for CTH non-con, which was negative. Pt also received 2 u FFP and started on 3 day course of pulse steroids (9/29-).

## 2020-09-30 NOTE — PROGRESS NOTE ADULT - ASSESSMENT
24 F with hx of TTP (followed by Dr. Berman at [facility]) s/p 22 sessions of PLEX at [facility] in 2014 followed by 4 doses of weekly Rituxan presenting with platelet count of 21K concerning for relapse of TTP.     #Relapsed TTP:  - Patient was initially diagnosed at age 18 and was treated at [facility] with 22 sessions of PLEX followed by Rituximab  - Plt 21 on admission  - Peripheral smear with 3-5 schistocytes per HPF  - [id number] level pending  - PLASMIC score of 7 indicating high risk   - Today is day 2 of PLEX, patient with improvement of platelets to 71.  - s/p 1 dose of solumedrol 1 AM on 9/29. 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   - check CBC w DIFF, retic count, CMP, LDH, haptoglobin daily.  - CTH negative for bleed  - Patient to receive Rituxan today dose 1.   - Plan will be for 4 weekly doses.   - Will see daily.

## 2020-10-01 LAB
ALBUMIN SERPL ELPH-MCNC: 3.3 G/DL — SIGNIFICANT CHANGE UP (ref 3.3–5)
ALP SERPL-CCNC: 41 U/L — SIGNIFICANT CHANGE UP (ref 40–120)
ALT FLD-CCNC: 14 U/L — SIGNIFICANT CHANGE UP (ref 10–45)
ANION GAP SERPL CALC-SCNC: 13 MMOL/L — SIGNIFICANT CHANGE UP (ref 5–17)
APTT BLD: 21.8 SEC — LOW (ref 27.5–35.5)
AST SERPL-CCNC: 12 U/L — SIGNIFICANT CHANGE UP (ref 10–40)
BASOPHILS # BLD AUTO: 0.02 K/UL — SIGNIFICANT CHANGE UP (ref 0–0.2)
BASOPHILS NFR BLD AUTO: 0.1 % — SIGNIFICANT CHANGE UP (ref 0–2)
BILIRUB SERPL-MCNC: 0.1 MG/DL — LOW (ref 0.2–1.2)
BUN SERPL-MCNC: 16 MG/DL — SIGNIFICANT CHANGE UP (ref 7–23)
CALCIUM SERPL-MCNC: 8.6 MG/DL — SIGNIFICANT CHANGE UP (ref 8.4–10.5)
CHLORIDE SERPL-SCNC: 106 MMOL/L — SIGNIFICANT CHANGE UP (ref 96–108)
CO2 SERPL-SCNC: 25 MMOL/L — SIGNIFICANT CHANGE UP (ref 22–31)
CREAT SERPL-MCNC: 0.68 MG/DL — SIGNIFICANT CHANGE UP (ref 0.5–1.3)
EOSINOPHIL # BLD AUTO: 0.03 K/UL — SIGNIFICANT CHANGE UP (ref 0–0.5)
EOSINOPHIL NFR BLD AUTO: 0.2 % — SIGNIFICANT CHANGE UP (ref 0–6)
GLUCOSE SERPL-MCNC: 103 MG/DL — HIGH (ref 70–99)
HAPTOGLOB SERPL-MCNC: 107 MG/DL — SIGNIFICANT CHANGE UP (ref 34–200)
HCT VFR BLD CALC: 31.2 % — LOW (ref 34.5–45)
HGB BLD-MCNC: 9.5 G/DL — LOW (ref 11.5–15.5)
IMM GRANULOCYTES NFR BLD AUTO: 1.1 % — SIGNIFICANT CHANGE UP (ref 0–1.5)
INR BLD: 0.96 RATIO — SIGNIFICANT CHANGE UP (ref 0.88–1.16)
LDH SERPL L TO P-CCNC: 173 U/L — SIGNIFICANT CHANGE UP (ref 50–242)
LYMPHOCYTES # BLD AUTO: 14.4 % — SIGNIFICANT CHANGE UP (ref 13–44)
LYMPHOCYTES # BLD AUTO: 2.2 K/UL — SIGNIFICANT CHANGE UP (ref 1–3.3)
MAGNESIUM SERPL-MCNC: 2.1 MG/DL — SIGNIFICANT CHANGE UP (ref 1.6–2.6)
MCHC RBC-ENTMCNC: 26.2 PG — LOW (ref 27–34)
MCHC RBC-ENTMCNC: 30.4 GM/DL — LOW (ref 32–36)
MCV RBC AUTO: 86.2 FL — SIGNIFICANT CHANGE UP (ref 80–100)
MONOCYTES # BLD AUTO: 0.77 K/UL — SIGNIFICANT CHANGE UP (ref 0–0.9)
MONOCYTES NFR BLD AUTO: 5.1 % — SIGNIFICANT CHANGE UP (ref 2–14)
NEUTROPHILS # BLD AUTO: 12.05 K/UL — HIGH (ref 1.8–7.4)
NEUTROPHILS NFR BLD AUTO: 79.1 % — HIGH (ref 43–77)
NRBC # BLD: 0 /100 WBCS — SIGNIFICANT CHANGE UP (ref 0–0)
PHOSPHATE SERPL-MCNC: 3.9 MG/DL — SIGNIFICANT CHANGE UP (ref 2.5–4.5)
PLATELET # BLD AUTO: 116 K/UL — LOW (ref 150–400)
POTASSIUM SERPL-MCNC: 3.8 MMOL/L — SIGNIFICANT CHANGE UP (ref 3.5–5.3)
POTASSIUM SERPL-SCNC: 3.8 MMOL/L — SIGNIFICANT CHANGE UP (ref 3.5–5.3)
PROT SERPL-MCNC: 5.3 G/DL — LOW (ref 6–8.3)
PROTHROM AB SERPL-ACNC: 11.5 SEC — SIGNIFICANT CHANGE UP (ref 10.6–13.6)
RBC # BLD: 3.62 M/UL — LOW (ref 3.8–5.2)
RBC # BLD: 3.62 M/UL — LOW (ref 3.8–5.2)
RBC # FLD: 16.2 % — HIGH (ref 10.3–14.5)
RETICS #: 116.6 K/UL — SIGNIFICANT CHANGE UP (ref 25–125)
RETICS/RBC NFR: 3.2 % — HIGH (ref 0.5–2.5)
SODIUM SERPL-SCNC: 144 MMOL/L — SIGNIFICANT CHANGE UP (ref 135–145)
WBC # BLD: 15.24 K/UL — HIGH (ref 3.8–10.5)
WBC # FLD AUTO: 15.24 K/UL — HIGH (ref 3.8–10.5)

## 2020-10-01 PROCEDURE — 99232 SBSQ HOSP IP/OBS MODERATE 35: CPT | Mod: GC

## 2020-10-01 PROCEDURE — 99232 SBSQ HOSP IP/OBS MODERATE 35: CPT

## 2020-10-01 PROCEDURE — 36514 APHERESIS PLASMA: CPT

## 2020-10-01 RX ORDER — ONDANSETRON 8 MG/1
4 TABLET, FILM COATED ORAL ONCE
Refills: 0 | Status: COMPLETED | OUTPATIENT
Start: 2020-10-01 | End: 2020-10-01

## 2020-10-01 RX ORDER — DIPHENHYDRAMINE HCL 50 MG
25 CAPSULE ORAL ONCE
Refills: 0 | Status: COMPLETED | OUTPATIENT
Start: 2020-10-01 | End: 2020-10-03

## 2020-10-01 RX ADMIN — CHLORHEXIDINE GLUCONATE 1 APPLICATION(S): 213 SOLUTION TOPICAL at 09:43

## 2020-10-01 RX ADMIN — Medication 58 MILLIGRAM(S): at 09:42

## 2020-10-01 RX ADMIN — ONDANSETRON 4 MILLIGRAM(S): 8 TABLET, FILM COATED ORAL at 09:42

## 2020-10-01 NOTE — DIETITIAN INITIAL EVALUATION ADULT. - OTHER INFO
Per chart, 25 y/o female admitted for TTP exacerbation   PMH: TTP, high cholesterol    Information obtained from: Pt, EMR    Prior to admission: Pt endorses good PO intake PTA, has tried various wt loss attempts. Most recently tried the keto diet and was successful, but had problem with her gallbladder and had to stop. She reports struggling with wt 2/2 chronic steroid use and s/p hip replacements needed after chronic steroid use in addition to emotional stressors over the past year. Overall, she endorses excess carbohydrate intake, drinking soda/sugary coffee and struggling with portion sizes, also skips breakfast. Pt takes b-complex vitamin, Multivitamin, selenium, AHCC mushroom extract, green tea extract and curcumin supplements.    This admission: Decreased appetite and PO intake 2/2 nausea although is trying to eat some of each meal to maintain energy/strength. She confirms NKFA, no difficulty chewing/swallowing, no GI distress.    Education: Pt is very interested in discussing wt loss strategies. Advised against keto dieting in the future and encouraged healthy mindset about eating. Discussed introducing breakfast, how to portion control carbs and complex vs refined carbs, healthy meal prep, snacks, setting habits etc. Set goals with pt to follow at home. Extensive education provided, along with written materials. Lastly, advised against unregulated supplements, encouraged pt to obtain nutrition via food.     Weight Hx: UBW is 270 pounds. Noted Dosing wt is 313 pounds (9/29), and 313 pounds (9/30). Bed scale weight was taken by RD which showed 298 pounds. Pt does not endorse wt gain or loss... could be due to fluid accumulation vs bed scale weight.

## 2020-10-01 NOTE — PROGRESS NOTE ADULT - SUBJECTIVE AND OBJECTIVE BOX
No new complaints today. Sitting comfortable in bed and working on her computer. PLEX#2 well tolerated yesterday. Rituxan completed by 6pm yesterday uneventful. Showing improvement in plt and hemolytic markers. Scheduled for PLEX later this evening.    MEDICATIONS  (PRN):  hydrocortisone sodium succinate Injectable 100 milliGRAM(s) IV Push once PRN rituximab reaction      Vital Signs Last 24 Hrs  T(C): 36.8 (01 Oct 2020 07:30), Max: 36.9 (30 Sep 2020 20:47)  T(F): 98.2 (01 Oct 2020 07:30), Max: 98.5 (30 Sep 2020 20:47)  HR: 68 (01 Oct 2020 07:30) (68 - 99)  BP: 98/57 (01 Oct 2020 07:30) (98/57 - 146/78)  RR: 18 (01 Oct 2020 07:30) (18 - 18)  SpO2: 96% (01 Oct 2020 07:30) (95% - 99%)    Daily Weight in k.1 (01 Oct 2020 12:10)    PHYSICAL EXAM:  General: WN/WD NAD  Eyes: No jaundice  ENT: MMM  Respiratory: CTA B/L  CV: RRR, no murmurs  Abdominal: Soft, NT, ND +BS  Musculoskeletal/Extremities: full range of motion X 4, mild edema  Neurology: A&Ox3, no focal deficits   Skin: LUE bruise healing  Catheters: ROMAN hassan dry and intact                          9.5    15.24 )-----------( 116      ( 01 Oct 2020 07:10 )             31.2       144  |  106  |  16  ----------------------------<  103<H>  3.8   |  25  |  0.68    Ca    8.6      01 Oct 2020 07:10  Phos  3.9     10-  Mg     2.1     10-    TPro  5.3<L>  /  Alb  3.3  /  TBili  0.1<L>  /  DBili  x   /  AST  12  /  ALT  14  /  AlkPhos  41  10-      Haptoglobin, Serum: 107 mg/dL (10- @ 10:11)      PT/INR - ( 01 Oct 2020 08:43 )   PT: 11.5 sec;   INR: 0.96 ratio    PTT - ( 01 Oct 2020 08:43 )  PTT:21.8 sec  Fibrinogen Assay: 623 mg/dL ( @ 23:49)

## 2020-10-01 NOTE — PROGRESS NOTE ADULT - ASSESSMENT
24F w hx of TTP (22 sessions of PLEX at [facility], high dose steroids, 4 doses Rituxan in 2014) p/w ecchymosis (spontaneous onset RUE 9/25), petechiae (onset BLE 9/27) , headaches, "seeing black dots," and fatigue. Patient's platelet count 21k, , and peripheral blood smear w/ schistocytes. Pt admitted to MICU for urgent shiley and PLEX and received one PLEX session, after which pt was on bedrest for 2 hours. Pt sent for CTH non-con, which was negative. Pt also received 2 u FFP and started on 3 day course of pulse steroids (9/29-10/1).

## 2020-10-01 NOTE — PROGRESS NOTE ADULT - ASSESSMENT
24 F with hx of TTP (followed by Dr. Berman at [facility]) s/p 22 sessions of PLEX at [facility] in 2014 followed by 4 doses of weekly Rituxan presenting with platelet count of 21K concerning for relapse of TTP.     #Relapsed TTP:  - Patient was initially diagnosed at age 18 and was treated at [facility] with 22 sessions of PLEX followed by Rituximab  - Plt 21 on admission  - Peripheral smear with 3-5 schistocytes per HPF  - [id number] level pending  - PLASMIC score of 7 indicating high risk   - Today is day 3 of PLEX, patient with improvement of platelets to 116 today .  - s/p 3 doses solumedrol. 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   -s/p Rituxan on 9/30. Dose #1 of 4 weekly doses.   - check CBC w DIFF, retic count, CMP, LDH, haptoglobin daily. Hemoglobin slightly lower, will monitor.   - CTH negative for bleed  - Will see daily. 24 F with hx of TTP (followed by Dr. Berman at [facility]) s/p 22 sessions of PLEX at [facility] in 2014 followed by 4 doses of weekly Rituxan presenting with platelet count of 21K concerning for relapse of TTP.     #Relapsed TTP:  - Patient was initially diagnosed at age 18 and was treated at [facility] with 22 sessions of PLEX followed by Rituximab  - Plt 21 on admission  - Peripheral smear with 3-5 schistocytes per HPF  - [id number] level pending  - PLASMIC score of 7 indicating high risk   - Today is day 4 of PLEX, patient with improvement of platelets into normal range today. Will do two more days of PLEX (including today) and then hold. Keep shiley in place until normal x 3 days post discontinuation of PLEX.   - s/p 3 doses solumedrol. 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   -s/p Rituxan on 9/30. Dose #1 of 4 weekly doses. Next dose due 10/7. Will keep her in house until completed that dose and if everything stable at that point will plan for d/c.   - check CBC w DIFF, retic count, CMP, LDH, haptoglobin daily. Hemoglobin slightly lower, will monitor.   - CTH negative for bleed  - Will see daily.

## 2020-10-01 NOTE — PROGRESS NOTE ADULT - SUBJECTIVE AND OBJECTIVE BOX
[facility] Division of Hospital Medicine  John Walters MD  Pager (M-F, 0O-4D): [phone]  Other Times:  [phone]    Patient is a 24y old  Female who presents with a chief complaint of TTP exacerbation (01 Oct 2020 12:54)      SUBJECTIVE / OVERNIGHT EVENTS: some ankle pain - is worried about steroids. mildly lightheaded today otherwise no complaints  ADDITIONAL REVIEW OF SYSTEMS:    MEDICATIONS  (STANDING):  chlorhexidine 4% Liquid 1 Application(s) Topical <User Schedule>  influenza   Vaccine 0.5 milliLiter(s) IntraMuscular once    MEDICATIONS  (PRN):  hydrocortisone sodium succinate Injectable 100 milliGRAM(s) IV Push once PRN rituximab reaction  sodium chloride 0.9% lock flush 10 milliLiter(s) IV Push every 1 hour PRN Pre/post blood products, medications, blood draw, and to maintain line patency      CAPILLARY BLOOD GLUCOSE      POCT Blood Glucose.: 115 mg/dL (01 Oct 2020 12:12)  POCT Blood Glucose.: 93 mg/dL (01 Oct 2020 08:35)    I&O's Summary    30 Sep 2020 07:01  -  01 Oct 2020 07:00  --------------------------------------------------------  IN: 1930 mL / OUT: 600 mL / NET: 1330 mL    01 Oct 2020 07:01  -  01 Oct 2020 14:01  --------------------------------------------------------  IN: 240 mL / OUT: 200 mL / NET: 40 mL        PHYSICAL EXAM:  Vital Signs Last 24 Hrs  T(C): 36.8 (01 Oct 2020 07:30), Max: 36.9 (30 Sep 2020 20:47)  T(F): 98.2 (01 Oct 2020 07:30), Max: 98.5 (30 Sep 2020 20:47)  HR: 68 (01 Oct 2020 07:30) (68 - 99)  BP: 98/57 (01 Oct 2020 07:30) (98/57 - 146/78)  BP(mean): --  RR: 18 (01 Oct 2020 07:30) (18 - 18)  SpO2: 96% (01 Oct 2020 07:30) (95% - 99%)  CONSTITUTIONAL: NAD, well-developed, well-groomed  EYES: conjunctiva and sclera clear  ENMT: Moist oral mucosa, no pharyngeal injection or exudates; normal dentition  NECK: Supple, no palpable masses; no thyromegaly R shiley receiving PLEX  RESPIRATORY: Normal respiratory effort; lungs are clear to auscultation bilaterally  CARDIOVASCULAR: Regular rate and rhythm, no murmur; No lower extremity edema; Peripheral pulses are 2+ bilaterally  ABDOMEN: Nontender to palpation, normoactive bowel sounds, no rebound/guarding; No hepatosplenomegaly  PSYCH: calm  NEUROLOGY: AOx3 nonfocal  SKIN: + bruising L arm, legs nonpitting edema b/l le    LABS:                        9.5    15.24 )-----------( 116      ( 01 Oct 2020 07:10 )             31.2     10-01    144  |  106  |  16  ----------------------------<  103<H>  3.8   |  25  |  0.68    Ca    8.6      01 Oct 2020 07:10  Phos  3.9     10-01  Mg     2.1     10-01    TPro  5.3<L>  /  Alb  3.3  /  TBili  0.1<L>  /  DBili  x   /  AST  12  /  ALT  14  /  AlkPhos  41  10-01    PT/INR - ( 01 Oct 2020 08:43 )   PT: 11.5 sec;   INR: 0.96 ratio         PTT - ( 01 Oct 2020 08:43 )  PTT:21.8 sec      RADIOLOGY & ADDITIONAL TESTS:  Results Reviewed:   Imaging Personally Reviewed:  Electrocardiogram Personally Reviewed:    COORDINATION OF CARE:  Care Discussed with Consultants/Other Providers [Y/N]: heme Dr Goldberg re plan of care  Prior or Outpatient Records Reviewed [Y/N]:

## 2020-10-01 NOTE — DIETITIAN INITIAL EVALUATION ADULT. - PHYSICAL APPEARANCE
other (specify)/obese Ht: 65 inches Wt: 298 pounds BMI: 49.5 kg/m2 IBW: 125 pounds(+/-10%) 238%IBW  2+ L/R feet edema. no pressure ulcers documented.

## 2020-10-01 NOTE — DIETITIAN INITIAL EVALUATION ADULT. - ADD RECOMMEND
Continue regular diet. Nutrition education provided, pt is aware RD remains available as needed. BMI alert placed in chart.

## 2020-10-01 NOTE — DIETITIAN INITIAL EVALUATION ADULT. - PERTINENT LABORATORY DATA
10-01 Na144 mmol/L Glu 103 mg/dL<H> K+ 3.8 mmol/L Cr  0.68 mg/dL BUN 16 mg/dL Phos 3.9 mg/dL Alb 3.3 g/dL PAB n/a

## 2020-10-01 NOTE — PROGRESS NOTE ADULT - SUBJECTIVE AND OBJECTIVE BOX
INTERVAL HPI/OVERNIGHT EVENTS:  Patient S&E at bedside. No o/n events, patient had slight nausea last night but no vomiting. She has some swelling in her legs. Otherwise she has no complaints currently.     PAST MEDICAL & SURGICAL HISTORY:  TTP (thrombotic thrombocytopenic purpura)    Iron deficiency    TTP (thrombotic thrombocytopenic purpura)    Cold sore    High cholesterol    S/P ankle arthrodesis        FAMILY HISTORY:  Family history of coronary artery bypass graft (Father)        REVIEW OF SYSTEMS  General: No fevers, no chills, no fatigue, no weight loss  Skin: No rash  ENMT: No sore throat, no dysphagia, no mouth sores	  Respiratory and Thorax: No dyspnea, no cough, no wheezing  Cardiovascular: No chest pain, no palpitations  Gastrointestinal:	+nausea no abdominal pain  Genitourinary: No dysuria  Musculoskeletal:	No joint pain, no muscle pain  Neurological:	No dizziness, no neuropathy  Psychiatric: No depression or anxiety  Hematology/Lymphatics: No easy bleeding or bruising, no swollen nodes noticed.       VITAL SIGNS:  T(F): 98.2 (10-01-20 @ 07:30)  HR: 68 (10-01-20 @ 07:30)  BP: 98/57 (10-01-20 @ 07:30)  RR: 18 (10-01-20 @ 07:30)  SpO2: 96% (10-01-20 @ 07:30)  Wt(kg): --    PHYSICAL EXAM:    Constitutional: NAD, obese  Eyes: EOMI, sclera non-icteric  Neck: supple, no masses, no JVD  Respiratory: CTA b/l, good air entry b/l  Cardiovascular: RRR, no M/R/G  Gastrointestinal: soft, NTND, no masses palpable, + BS, no hepatosplenomegaly  Extremities: trace edema in ankles   Neurological: AAOx3      MEDICATIONS  (STANDING):  chlorhexidine 4% Liquid 1 Application(s) Topical <User Schedule>  influenza   Vaccine 0.5 milliLiter(s) IntraMuscular once    MEDICATIONS  (PRN):  hydrocortisone sodium succinate Injectable 100 milliGRAM(s) IV Push once PRN rituximab reaction  sodium chloride 0.9% lock flush 10 milliLiter(s) IV Push every 1 hour PRN Pre/post blood products, medications, blood draw, and to maintain line patency      Allergies    No Known Allergies    Intolerances        LABS:                        9.5    15.24 )-----------( 116      ( 01 Oct 2020 07:10 )             31.2     10-01    144  |  106  |  16  ----------------------------<  103<H>  3.8   |  25  |  0.68    Ca    8.6      01 Oct 2020 07:10  Phos  3.9     10-01  Mg     2.1     10-01    TPro  5.3<L>  /  Alb  3.3  /  TBili  0.1<L>  /  DBili  x   /  AST  12  /  ALT  14  /  AlkPhos  41  10-01    PT/INR - ( 01 Oct 2020 08:43 )   PT: 11.5 sec;   INR: 0.96 ratio         PTT - ( 01 Oct 2020 08:43 )  PTT:21.8 sec        RADIOLOGY & ADDITIONAL TESTS:  Studies reviewed.

## 2020-10-01 NOTE — PROGRESS NOTE ADULT - ASSESSMENT
23yo F hx of TTP in 2014 now presented with petechiae and blurry vision was noted to have low plt of 21. Diagnosed with relapse of TTP with elevated LDH and schistocytes on current admission. Patient was started on PLEX, she is S/p PLEX#2 and rituxan#1 yesterday. S/p 3 dose steroids. Responding well, plt 116 today with  and haptoglobin 100. No new complaints. [id number] assay pending results.    Continue current plan of daily PLEX with FFP as replacement with goal of Plt 150K for 2-3 days. Weekly Rituxan per heme.  Trend daily CBC.  PLEX#3 scheduled today with plasma as replacement fluid and premedication with benadryl.

## 2020-10-02 DIAGNOSIS — B00.1 HERPESVIRAL VESICULAR DERMATITIS: ICD-10-CM

## 2020-10-02 DIAGNOSIS — E66.01 MORBID (SEVERE) OBESITY DUE TO EXCESS CALORIES: ICD-10-CM

## 2020-10-02 LAB
ADAMTS13 ACTIVITY: <2 % — SIGNIFICANT CHANGE UP
ADAMTS13 REFLEX COMMENT: SIGNIFICANT CHANGE UP
ALBUMIN SERPL ELPH-MCNC: 3.9 G/DL — SIGNIFICANT CHANGE UP (ref 3.3–5)
ALP SERPL-CCNC: 54 U/L — SIGNIFICANT CHANGE UP (ref 40–120)
ALT FLD-CCNC: 17 U/L — SIGNIFICANT CHANGE UP (ref 10–45)
ANION GAP SERPL CALC-SCNC: 12 MMOL/L — SIGNIFICANT CHANGE UP (ref 5–17)
APTT BLD: 26.6 SEC — LOW (ref 27.5–35.5)
AST SERPL-CCNC: 17 U/L — SIGNIFICANT CHANGE UP (ref 10–40)
BASOPHILS # BLD AUTO: 0.03 K/UL — SIGNIFICANT CHANGE UP (ref 0–0.2)
BASOPHILS NFR BLD AUTO: 0.2 % — SIGNIFICANT CHANGE UP (ref 0–2)
BILIRUB SERPL-MCNC: 0.1 MG/DL — LOW (ref 0.2–1.2)
BUN SERPL-MCNC: 20 MG/DL — SIGNIFICANT CHANGE UP (ref 7–23)
CALCIUM SERPL-MCNC: 9.3 MG/DL — SIGNIFICANT CHANGE UP (ref 8.4–10.5)
CHLORIDE SERPL-SCNC: 101 MMOL/L — SIGNIFICANT CHANGE UP (ref 96–108)
CO2 SERPL-SCNC: 27 MMOL/L — SIGNIFICANT CHANGE UP (ref 22–31)
CREAT SERPL-MCNC: 0.7 MG/DL — SIGNIFICANT CHANGE UP (ref 0.5–1.3)
EOSINOPHIL # BLD AUTO: 0 K/UL — SIGNIFICANT CHANGE UP (ref 0–0.5)
EOSINOPHIL NFR BLD AUTO: 0 % — SIGNIFICANT CHANGE UP (ref 0–6)
GLUCOSE SERPL-MCNC: 122 MG/DL — HIGH (ref 70–99)
HAPTOGLOB SERPL-MCNC: 116 MG/DL — SIGNIFICANT CHANGE UP (ref 34–200)
HCT VFR BLD CALC: 32.9 % — LOW (ref 34.5–45)
HGB BLD-MCNC: 10.3 G/DL — LOW (ref 11.5–15.5)
IMM GRANULOCYTES NFR BLD AUTO: 3.4 % — HIGH (ref 0–1.5)
INR BLD: 1.02 RATIO — SIGNIFICANT CHANGE UP (ref 0.88–1.16)
LDH SERPL L TO P-CCNC: 193 U/L — SIGNIFICANT CHANGE UP (ref 50–242)
LYMPHOCYTES # BLD AUTO: 15 % — SIGNIFICANT CHANGE UP (ref 13–44)
LYMPHOCYTES # BLD AUTO: 2.78 K/UL — SIGNIFICANT CHANGE UP (ref 1–3.3)
MAGNESIUM SERPL-MCNC: 2.2 MG/DL — SIGNIFICANT CHANGE UP (ref 1.6–2.6)
MCHC RBC-ENTMCNC: 26.8 PG — LOW (ref 27–34)
MCHC RBC-ENTMCNC: 31.3 GM/DL — LOW (ref 32–36)
MCV RBC AUTO: 85.5 FL — SIGNIFICANT CHANGE UP (ref 80–100)
MONOCYTES # BLD AUTO: 0.98 K/UL — HIGH (ref 0–0.9)
MONOCYTES NFR BLD AUTO: 5.3 % — SIGNIFICANT CHANGE UP (ref 2–14)
NEUTROPHILS # BLD AUTO: 14.16 K/UL — HIGH (ref 1.8–7.4)
NEUTROPHILS NFR BLD AUTO: 76.1 % — SIGNIFICANT CHANGE UP (ref 43–77)
NRBC # BLD: 0 /100 WBCS — SIGNIFICANT CHANGE UP (ref 0–0)
PLATELET # BLD AUTO: 215 K/UL — SIGNIFICANT CHANGE UP (ref 150–400)
POTASSIUM SERPL-MCNC: 4.2 MMOL/L — SIGNIFICANT CHANGE UP (ref 3.5–5.3)
POTASSIUM SERPL-SCNC: 4.2 MMOL/L — SIGNIFICANT CHANGE UP (ref 3.5–5.3)
PROT SERPL-MCNC: 6.1 G/DL — SIGNIFICANT CHANGE UP (ref 6–8.3)
PROTHROM AB SERPL-ACNC: 12 SEC — SIGNIFICANT CHANGE UP (ref 10.6–13.6)
RBC # BLD: 3.85 M/UL — SIGNIFICANT CHANGE UP (ref 3.8–5.2)
RBC # BLD: 3.85 M/UL — SIGNIFICANT CHANGE UP (ref 3.8–5.2)
RBC # FLD: 16 % — HIGH (ref 10.3–14.5)
RETICS #: 141.3 K/UL — HIGH (ref 25–125)
RETICS/RBC NFR: 3.7 % — HIGH (ref 0.5–2.5)
SODIUM SERPL-SCNC: 140 MMOL/L — SIGNIFICANT CHANGE UP (ref 135–145)
VWF CP INHIB PPP-ACNC: 83 UNITS/ML — HIGH
WBC # BLD: 18.59 K/UL — HIGH (ref 3.8–10.5)
WBC # FLD AUTO: 18.59 K/UL — HIGH (ref 3.8–10.5)

## 2020-10-02 PROCEDURE — 99233 SBSQ HOSP IP/OBS HIGH 50: CPT

## 2020-10-02 PROCEDURE — 99232 SBSQ HOSP IP/OBS MODERATE 35: CPT | Mod: GC

## 2020-10-02 PROCEDURE — 36514 APHERESIS PLASMA: CPT

## 2020-10-02 RX ORDER — ACYCLOVIR SODIUM 500 MG
400 VIAL (EA) INTRAVENOUS THREE TIMES A DAY
Refills: 0 | Status: DISCONTINUED | OUTPATIENT
Start: 2020-10-02 | End: 2020-10-10

## 2020-10-02 RX ADMIN — CHLORHEXIDINE GLUCONATE 1 APPLICATION(S): 213 SOLUTION TOPICAL at 14:34

## 2020-10-02 RX ADMIN — Medication 100 UNIT(S): at 00:16

## 2020-10-02 RX ADMIN — Medication 400 MILLIGRAM(S): at 22:41

## 2020-10-02 RX ADMIN — Medication 100 UNIT(S): at 00:18

## 2020-10-02 NOTE — PROGRESS NOTE ADULT - SUBJECTIVE AND OBJECTIVE BOX
[facility] Division of Hospital Medicine  John Walters MD  Pager (M-F, 1A-0N): [phone]  Other Times:  [phone]    Patient is a 24y old  Female who presents with a chief complaint of TTP exacerbation (01 Oct 2020 14:11)    SUBJECTIVE / OVERNIGHT EVENTS: ankle pain resolved. pt notes she has some postnasal drip, no sore throat, no cough. she feels a cold sore is coming and wants to start acyclovir.   ADDITIONAL REVIEW OF SYSTEMS:    MEDICATIONS  (STANDING):  acyclovir   Oral Tab/Cap 400 milliGRAM(s) Oral three times a day  chlorhexidine 4% Liquid 1 Application(s) Topical <User Schedule>  influenza   Vaccine 0.5 milliLiter(s) IntraMuscular once    MEDICATIONS  (PRN):  diphenhydrAMINE   Injectable 25 milliGRAM(s) IV Push once PRN Itching  hydrocortisone sodium succinate Injectable 100 milliGRAM(s) IV Push once PRN rituximab reaction  sodium chloride 0.9% lock flush 10 milliLiter(s) IV Push every 1 hour PRN Pre/post blood products, medications, blood draw, and to maintain line patency      CAPILLARY BLOOD GLUCOSE      POCT Blood Glucose.: 87 mg/dL (02 Oct 2020 12:55)  POCT Blood Glucose.: 114 mg/dL (02 Oct 2020 09:14)  POCT Blood Glucose.: 233 mg/dL (01 Oct 2020 21:06)  POCT Blood Glucose.: 116 mg/dL (01 Oct 2020 16:54)    I&O's Summary    01 Oct 2020 07:01  -  02 Oct 2020 07:00  --------------------------------------------------------  IN: 660 mL / OUT: 1500 mL / NET: -840 mL        PHYSICAL EXAM:  Vital Signs Last 24 Hrs  T(C): 36.7 (02 Oct 2020 09:00), Max: 36.8 (01 Oct 2020 18:48)  T(F): 98.1 (02 Oct 2020 09:00), Max: 98.3 (01 Oct 2020 18:48)  HR: 81 (02 Oct 2020 09:00) (69 - 93)  BP: 113/71 (02 Oct 2020 09:00) (113/71 - 144/75)  BP(mean): --  RR: 18 (02 Oct 2020 09:00) (18 - 18)  SpO2: 99% (02 Oct 2020 09:00) (94% - 99%)  CONSTITUTIONAL: NAD, well-developed, well-groomed  EYES: conjunctiva and sclera clear  ENMT: Moist oral mucosa, no pharyngeal injection or exudates; no obvious lesions normal dentition  NECK: Supple, no palpable masses; no thyromegaly R shiley receiving PLEX  RESPIRATORY: Normal respiratory effort; lungs are clear to auscultation bilaterally  CARDIOVASCULAR: Regular rate and rhythm, no murmur; No lower extremity edema; Peripheral pulses are 2+ bilaterally  ABDOMEN: Nontender to palpation, normoactive bowel sounds, no rebound/guarding; No hepatosplenomegaly  PSYCH: calm  NEUROLOGY: AOx3 nonfocal  SKIN: + bruising L arm, legs nonpitting edema b/l le    LABS:                        10.3   18.59 )-----------( 215      ( 02 Oct 2020 09:17 )             32.9     10-02    140  |  101  |  20  ----------------------------<  122<H>  4.2   |  27  |  0.70    Ca    9.3      02 Oct 2020 09:17  Phos  3.9     10-01  Mg     2.2     10-02    TPro  6.1  /  Alb  3.9  /  TBili  0.1<L>  /  DBili  x   /  AST  17  /  ALT  17  /  AlkPhos  54  10-02    PT/INR - ( 02 Oct 2020 12:35 )   PT: 12.0 sec;   INR: 1.02 ratio    PTT - ( 02 Oct 2020 12:35 )  PTT:26.6 sec    RADIOLOGY & ADDITIONAL TESTS:  Results Reviewed:   Imaging Personally Reviewed:   Electrocardiogram Personally Reviewed:    COORDINATION OF CARE:  Care Discussed with Consultants/Other Providers [Y/N]: heme Dr Goldberg re PLEX and rituxan plan  Prior or Outpatient Records Reviewed [Y/N]:

## 2020-10-03 LAB
ALBUMIN SERPL ELPH-MCNC: 3.4 G/DL — SIGNIFICANT CHANGE UP (ref 3.3–5)
ALBUMIN SERPL ELPH-MCNC: 3.4 G/DL — SIGNIFICANT CHANGE UP (ref 3.3–5)
ALP SERPL-CCNC: 47 U/L — SIGNIFICANT CHANGE UP (ref 40–120)
ALP SERPL-CCNC: 52 U/L — SIGNIFICANT CHANGE UP (ref 40–120)
ALT FLD-CCNC: 13 U/L — SIGNIFICANT CHANGE UP (ref 10–45)
ALT FLD-CCNC: 16 U/L — SIGNIFICANT CHANGE UP (ref 10–45)
ANION GAP SERPL CALC-SCNC: 12 MMOL/L — SIGNIFICANT CHANGE UP (ref 5–17)
ANION GAP SERPL CALC-SCNC: 8 MMOL/L — SIGNIFICANT CHANGE UP (ref 5–17)
AST SERPL-CCNC: 17 U/L — SIGNIFICANT CHANGE UP (ref 10–40)
AST SERPL-CCNC: 20 U/L — SIGNIFICANT CHANGE UP (ref 10–40)
BASOPHILS # BLD AUTO: 0.03 K/UL — SIGNIFICANT CHANGE UP (ref 0–0.2)
BASOPHILS NFR BLD AUTO: 0.3 % — SIGNIFICANT CHANGE UP (ref 0–2)
BILIRUB SERPL-MCNC: 0.1 MG/DL — LOW (ref 0.2–1.2)
BILIRUB SERPL-MCNC: 0.1 MG/DL — LOW (ref 0.2–1.2)
BUN SERPL-MCNC: 18 MG/DL — SIGNIFICANT CHANGE UP (ref 7–23)
BUN SERPL-MCNC: 20 MG/DL — SIGNIFICANT CHANGE UP (ref 7–23)
CALCIUM SERPL-MCNC: 8.2 MG/DL — LOW (ref 8.4–10.5)
CALCIUM SERPL-MCNC: 8.4 MG/DL — SIGNIFICANT CHANGE UP (ref 8.4–10.5)
CHLORIDE SERPL-SCNC: 100 MMOL/L — SIGNIFICANT CHANGE UP (ref 96–108)
CHLORIDE SERPL-SCNC: 103 MMOL/L — SIGNIFICANT CHANGE UP (ref 96–108)
CO2 SERPL-SCNC: 27 MMOL/L — SIGNIFICANT CHANGE UP (ref 22–31)
CO2 SERPL-SCNC: 31 MMOL/L — SIGNIFICANT CHANGE UP (ref 22–31)
CREAT SERPL-MCNC: 0.75 MG/DL — SIGNIFICANT CHANGE UP (ref 0.5–1.3)
CREAT SERPL-MCNC: 0.79 MG/DL — SIGNIFICANT CHANGE UP (ref 0.5–1.3)
EOSINOPHIL # BLD AUTO: 0.14 K/UL — SIGNIFICANT CHANGE UP (ref 0–0.5)
EOSINOPHIL NFR BLD AUTO: 1.2 % — SIGNIFICANT CHANGE UP (ref 0–6)
GLUCOSE SERPL-MCNC: 135 MG/DL — HIGH (ref 70–99)
GLUCOSE SERPL-MCNC: 84 MG/DL — SIGNIFICANT CHANGE UP (ref 70–99)
HCT VFR BLD CALC: 32.2 % — LOW (ref 34.5–45)
HCT VFR BLD CALC: 33 % — LOW (ref 34.5–45)
HGB BLD-MCNC: 10.3 G/DL — LOW (ref 11.5–15.5)
HGB BLD-MCNC: 10.4 G/DL — LOW (ref 11.5–15.5)
IMM GRANULOCYTES NFR BLD AUTO: 2.5 % — HIGH (ref 0–1.5)
LDH SERPL L TO P-CCNC: 185 U/L — SIGNIFICANT CHANGE UP (ref 50–242)
LYMPHOCYTES # BLD AUTO: 3.89 K/UL — HIGH (ref 1–3.3)
LYMPHOCYTES # BLD AUTO: 32.8 % — SIGNIFICANT CHANGE UP (ref 13–44)
MCHC RBC-ENTMCNC: 27 PG — SIGNIFICANT CHANGE UP (ref 27–34)
MCHC RBC-ENTMCNC: 27.7 PG — SIGNIFICANT CHANGE UP (ref 27–34)
MCHC RBC-ENTMCNC: 31.2 GM/DL — LOW (ref 32–36)
MCHC RBC-ENTMCNC: 32.3 GM/DL — SIGNIFICANT CHANGE UP (ref 32–36)
MCV RBC AUTO: 85.6 FL — SIGNIFICANT CHANGE UP (ref 80–100)
MCV RBC AUTO: 86.6 FL — SIGNIFICANT CHANGE UP (ref 80–100)
MONOCYTES # BLD AUTO: 0.6 K/UL — SIGNIFICANT CHANGE UP (ref 0–0.9)
MONOCYTES NFR BLD AUTO: 5.1 % — SIGNIFICANT CHANGE UP (ref 2–14)
NEUTROPHILS # BLD AUTO: 6.89 K/UL — SIGNIFICANT CHANGE UP (ref 1.8–7.4)
NEUTROPHILS NFR BLD AUTO: 58.1 % — SIGNIFICANT CHANGE UP (ref 43–77)
NRBC # BLD: 0 /100 WBCS — SIGNIFICANT CHANGE UP (ref 0–0)
NRBC # BLD: 0 /100 WBCS — SIGNIFICANT CHANGE UP (ref 0–0)
PLATELET # BLD AUTO: 245 K/UL — SIGNIFICANT CHANGE UP (ref 150–400)
PLATELET # BLD AUTO: 262 K/UL — SIGNIFICANT CHANGE UP (ref 150–400)
POTASSIUM SERPL-MCNC: 3.6 MMOL/L — SIGNIFICANT CHANGE UP (ref 3.5–5.3)
POTASSIUM SERPL-MCNC: 3.9 MMOL/L — SIGNIFICANT CHANGE UP (ref 3.5–5.3)
POTASSIUM SERPL-SCNC: 3.6 MMOL/L — SIGNIFICANT CHANGE UP (ref 3.5–5.3)
POTASSIUM SERPL-SCNC: 3.9 MMOL/L — SIGNIFICANT CHANGE UP (ref 3.5–5.3)
PROT SERPL-MCNC: 5.1 G/DL — LOW (ref 6–8.3)
PROT SERPL-MCNC: 5.2 G/DL — LOW (ref 6–8.3)
RBC # BLD: 3.76 M/UL — LOW (ref 3.8–5.2)
RBC # BLD: 3.81 M/UL — SIGNIFICANT CHANGE UP (ref 3.8–5.2)
RBC # FLD: 16.2 % — HIGH (ref 10.3–14.5)
RBC # FLD: 16.5 % — HIGH (ref 10.3–14.5)
SODIUM SERPL-SCNC: 139 MMOL/L — SIGNIFICANT CHANGE UP (ref 135–145)
SODIUM SERPL-SCNC: 142 MMOL/L — SIGNIFICANT CHANGE UP (ref 135–145)
WBC # BLD: 11.85 K/UL — HIGH (ref 3.8–10.5)
WBC # BLD: 14.28 K/UL — HIGH (ref 3.8–10.5)
WBC # FLD AUTO: 11.85 K/UL — HIGH (ref 3.8–10.5)
WBC # FLD AUTO: 14.28 K/UL — HIGH (ref 3.8–10.5)

## 2020-10-03 PROCEDURE — 99233 SBSQ HOSP IP/OBS HIGH 50: CPT

## 2020-10-03 PROCEDURE — 36514 APHERESIS PLASMA: CPT

## 2020-10-03 PROCEDURE — 99232 SBSQ HOSP IP/OBS MODERATE 35: CPT

## 2020-10-03 RX ADMIN — Medication 400 MILLIGRAM(S): at 05:58

## 2020-10-03 RX ADMIN — Medication 400 MILLIGRAM(S): at 12:51

## 2020-10-03 RX ADMIN — Medication 25 MILLIGRAM(S): at 12:50

## 2020-10-03 RX ADMIN — Medication 400 MILLIGRAM(S): at 21:18

## 2020-10-03 NOTE — PROGRESS NOTE ADULT - ASSESSMENT
25yo F hx of TTP in 2014 now presented with petechiae and blurry vision was noted to have low plt of 21. Diagnosed with relapse of TTP with elevated LDH and schistocytes on current admission. Patient was started on PLEX, she is S/p PLEX#3, s/p 3x steroid and s/p Rituxan#1 good response, plt ~200 LDH ~170 adn haptoglobin 100. No new complaints. [id number] assay pending results.    Continue current plan of daily PLEX with FFP as replacement with goal of Plt 150K for 2-3 days. Weekly Rituxan per heme.  Trend daily CBC.  Seen and examined on 10/2 before PLEX. PLEX#4 performed with FFP as replacement. D/w apheresis nurse. Procedure well tolerated.     PLEX#5 scheduled on 10/3. No PLEX on Sunday, monitor for platelet, if there is any drop we can schedule PLEX. Plan d/w team and patient.

## 2020-10-03 NOTE — PROGRESS NOTE ADULT - ASSESSMENT
Patient is a 24y old  Female who presents with a chief complaint of TTP exacerbation. Started on plasma exchange and s/p  Rituxan.  Plasma exchange#5 was done today. Few minutes after completion of the exchange she developed hives and itching which responded to benadryl. No PLEX on Sunday, monitor for platelet, if there is any drop will arrange for the exchange.

## 2020-10-03 NOTE — PROGRESS NOTE ADULT - SUBJECTIVE AND OBJECTIVE BOX
Patient is a 24y old  Female who presents with a chief complaint of TTP exacerbation (03 Oct 2020 15:59)  Plasma exchange#5 was done today. Few minutes after completion of the exchange she developed hives and itching which responded to benadryl.     Vital Signs Last 24 Hrs  T(C): 36.9 (03 Oct 2020 18:31), Max: 37 (03 Oct 2020 00:27)  T(F): 98.4 (03 Oct 2020 18:31), Max: 98.6 (03 Oct 2020 00:27)  HR: 91 (03 Oct 2020 18:31) (81 - 96)  BP: 108/68 (03 Oct 2020 18:31) (86/51 - 128/87)  BP(mean): --  RR: 16 (03 Oct 2020 18:31) (16 - 18)  SpO2: 98% (03 Oct 2020 18:31) (98% - 100%)  Allergies    No Known Allergies    Intolerances      PAST MEDICAL & SURGICAL HISTORY:  TTP (thrombotic thrombocytopenic purpura)    Iron deficiency    TTP (thrombotic thrombocytopenic purpura)    Cold sore    High cholesterol    S/P ankle arthrodesis                            10.4   14.28 )-----------( 262      ( 03 Oct 2020 18:32 )             32.2     PT/INR - ( 02 Oct 2020 12:35 )   PT: 12.0 sec;   INR: 1.02 ratio         PTT - ( 02 Oct 2020 12:35 )  PTT:26.6 sec    Lactate Dehydrogenase, Serum: 185 U/L (10-03 @ 18:32)  Lactate Dehydrogenase, Serum: 193 U/L (10-02 @ 09:17)  Lactate Dehydrogenase, Serum: 173 U/L (10-01 @ 07:10)    [id number]    10-03    139  |  100  |  18  ----------------------------<  135<H>  3.6   |  31  |  0.79    Ca    8.4      03 Oct 2020 18:32  Mg     2.2     10-02    TPro  5.1<L>  /  Alb  3.4  /  TBili  0.1<L>  /  DBili  x   /  AST  20  /  ALT  16  /  AlkPhos  52  10-03

## 2020-10-03 NOTE — CHART NOTE - NSCHARTNOTEFT_GEN_A_CORE
CC : LEFT ARM PAIN WITH NUMBNESS AND TINGLING  Patient with ITP and daily PLEX , patient received her PLEX treatment today and had blood pressure cuff on left arm during treatment  Arm examined and noted good radial pulses, eccymotic at the wrist area but eccymosis has been there before. Patient stated numbness has gone and arm much better.  Plan: no intervention needed at this time since numbness and tingling has disappeared, vascular exam was normal and no evidence of compromise tissue perfusion . CC : LEFT ARM PAIN WITH NUMBNESS AND TINGLING  Patient with ITP and daily PLEX , patient received her PLEX treatment today and had blood pressure cuff on left arm during treatment.  Patient seen and examined and in no distress, Best friend at bedside.  Arm examined and noted good radial pulses, eccymotic at the wrist area but eccymosis has been there before. Patient stated numbness has gone and arm much better. Nurse also reported blood pressure dropped in the 70's during plex and patient developed rash on upper arm.  Vital Signs Last 24 Hrs  T(C): 36.9 (03 Oct 2020 15:00), Max: 37 (03 Oct 2020 00:27)  T(F): 98.5 (03 Oct 2020 15:00), Max: 98.6 (03 Oct 2020 00:27)  HR: 89 (03 Oct 2020 15:00) (81 - 96)  BP: 114/73 (03 Oct 2020 15:00) (86/51 - 128/87)  BP(mean): --  RR: 16 (03 Oct 2020 15:00) (16 - 18)  SpO2: 98% (03 Oct 2020 15:00) (98% - 100%)  No rash noted upon my examination except for ecchymosis and blood pressure is stable now.   Plan: no intervention needed at this time since numbness and tingling has disappeared, vascular exam was normal and no evidence of compromise tissue perfusion . CC : LEFT ARM PAIN WITH NUMBNESS AND TINGLING  Patient with ITP and daily PLEX , patient received her PLEX treatment today and had blood pressure cuff on left arm during treatment.  Patient seen and examined and in no distress, Best friend at bedside.  Arm examined and noted good radial pulses, ecchymotic at the wrist area but ecchymosis has been there before. Patient stated numbness has gone and arm much better. Nurse also reported blood pressure dropped in the 70's during plex and patient developed rash on upper arm. Patient was asymptomatic and received benadryl. Thus far she has no complaints to offer.  Vital Signs Last 24 Hrs  T(C): 36.9 (03 Oct 2020 15:00), Max: 37 (03 Oct 2020 00:27)  T(F): 98.5 (03 Oct 2020 15:00), Max: 98.6 (03 Oct 2020 00:27)  HR: 89 (03 Oct 2020 15:00) (81 - 96)  BP: 114/73 (03 Oct 2020 15:00) (86/51 - 128/87)  BP(mean): --  RR: 16 (03 Oct 2020 15:00) (16 - 18)  SpO2: 98% (03 Oct 2020 15:00) (98% - 100%)  No rash noted upon my examination except for ecchymosis and blood pressure is stable now.   Plan: no intervention needed at this time since numbness and tingling has disappeared, vascular exam was normal and no evidence of compromise tissue perfusion .

## 2020-10-03 NOTE — PROGRESS NOTE ADULT - SUBJECTIVE AND OBJECTIVE BOX
Seen and examined in her room on 10/2. PLEX on 10/1 well tolerated. No overnight events. No complaints today. Plt count ~200 and hemolytic markers wnl.       MEDICATIONS  (PRN):  diphenhydrAMINE   Injectable 25 milliGRAM(s) IV Push once PRN Itching  hydrocortisone sodium succinate Injectable 100 milliGRAM(s) IV Push once PRN rituximab reaction  sodium chloride 0.9% lock flush 10 milliLiter(s) IV Push every 1 hour PRN Pre/post blood products, medications, blood draw, and to maintain line patency    Vital Signs Last 24 Hrs  T(C): 36.9 (03 Oct 2020 05:05), Max: 37 (03 Oct 2020 00:27)  T(F): 98.4 (03 Oct 2020 05:05), Max: 98.6 (03 Oct 2020 00:27)  HR: 81 (03 Oct 2020 05:05) (81 - 89)  BP: 114/76 (03 Oct 2020 05:05) (111/71 - 128/87)  RR: 18 (03 Oct 2020 05:05) (18 - 18)  SpO2: 99% (03 Oct 2020 05:05) (99% - 100%)    PHYSICAL EXAM:  General: WN/WD NAD  Eyes: No jaundice  ENT: MMM  Respiratory: CTA B/L  CV: RRR, no murmurs  Abdominal: Soft, NT, ND +BS  Musculoskeletal/Extremities: full range of motion X 4, no edema  Neurology: A&Ox3, no focal deficits   Skin: healing petechiae  Catheters: RIJ shiley dry and intact                          10.3   18.59 )-----------( 215      ( 02 Oct 2020 09:17 )             32.9     Reticulocyte Percent: 3.7 % (10-02 @ 09:17)  Reticulocyte Percent: 3.2 % (10-01 @ 07:10)    Hematocrit: 32.9 % (10-02 @ 09:17)  Hematocrit: 31.2 % (10-01 @ 07:10)    10-02    140  |  101  |  20  ----------------------------<  122<H>  4.2   |  27  |  0.70    Ca    9.3      02 Oct 2020 09:17  Mg     2.2     10-02    TPro  6.1  /  Alb  3.9  /  TBili  0.1<L>  /  DBili  x   /  AST  17  /  ALT  17  /  AlkPhos  54  10-02    Lactate Dehydrogenase, Serum: 193 U/L (10-02 @ 09:17)  Lactate Dehydrogenase, Serum: 173 U/L (10-01 @ 07:10)    Haptoglobin, Serum: 116 mg/dL (10-02 @ 14:06)  Haptoglobin, Serum: 107 mg/dL (10-01 @ 10:11)    PT/INR - ( 02 Oct 2020 12:35 )   PT: 12.0 sec;   INR: 1.02 ratio        PTT - ( 02 Oct 2020 12:35 )  PTT:26.6 sec

## 2020-10-03 NOTE — PROGRESS NOTE ADULT - SUBJECTIVE AND OBJECTIVE BOX
Chief Complaint: TTP    INTERVAL HPI/OVERNIGHT EVENTS: no events overnight     MEDICATIONS  (STANDING):  acyclovir   Oral Tab/Cap 400 milliGRAM(s) Oral three times a day  chlorhexidine 4% Liquid 1 Application(s) Topical <User Schedule>  influenza   Vaccine 0.5 milliLiter(s) IntraMuscular once    MEDICATIONS  (PRN):  hydrocortisone sodium succinate Injectable 100 milliGRAM(s) IV Push once PRN rituximab reaction  sodium chloride 0.9% lock flush 10 milliLiter(s) IV Push every 1 hour PRN Pre/post blood products, medications, blood draw, and to maintain line patency      Allergies    No Known Allergies    Intolerances        ROS: as above     Vital Signs Last 24 Hrs  T(C): 36.7 (04 Oct 2020 04:37), Max: 36.9 (03 Oct 2020 13:35)  T(F): 98.1 (04 Oct 2020 04:37), Max: 98.5 (03 Oct 2020 15:00)  HR: 71 (04 Oct 2020 04:37) (71 - 102)  BP: 99/68 (04 Oct 2020 04:37) (86/51 - 121/70)  BP(mean): --  RR: 17 (04 Oct 2020 04:37) (16 - 18)  SpO2: 99% (04 Oct 2020 04:37) (98% - 99%)    Physical Exam:   AAO x 3, NAD   RRR S1S2  CTA b/l   soft NTNDBS+  no c/c/e    LABS:                        10.4   14.28 )-----------( 262      ( 03 Oct 2020 18:32 )             32.2     10-03    139  |  100  |  18  ----------------------------<  135<H>  3.6   |  31  |  0.79    Ca    8.4      03 Oct 2020 18:32  Mg     2.2     10-02    TPro  5.1<L>  /  Alb  3.4  /  TBili  0.1<L>  /  DBili  x   /  AST  20  /  ALT  16  /  AlkPhos  52  10-03    PT/INR - ( 02 Oct 2020 12:35 )   PT: 12.0 sec;   INR: 1.02 ratio         PTT - ( 02 Oct 2020 12:35 )  PTT:26.6 sec

## 2020-10-03 NOTE — PROGRESS NOTE ADULT - ASSESSMENT
24F w hx of TTP (22 sessions of PLEX at [facility], high dose steroids, 4 doses Rituxan in 2014) p/w ecchymosis (spontaneous onset RUE 9/25), petechiae (onset BLE 9/27) , headaches,  and fatigue. Patient's platelet count 21k, , and peripheral blood smear w/ schistocytes. Pt admitted to MICU for urgent shiley and PLEX / pulse steroids for relapsed TTP now w/ improving platelet levels.

## 2020-10-03 NOTE — PROGRESS NOTE ADULT - SUBJECTIVE AND OBJECTIVE BOX
[facility] Division of Hospital Medicine  Nhan Gill MD  Pager (BEROT-JOHN, 8O-5G): [phone]  Other Times:  [phone]    Patient is a 24y old  Female who presents with a chief complaint of TTP exacerbation (03 Oct 2020 08:32)      SUBJECTIVE / OVERNIGHT EVENTS: no acute events. Feels well. Gum bleeding has resolved. No abd pain.    ADDITIONAL REVIEW OF SYSTEMS:  no cp, sob, cough, n/v, dark urine    MEDICATIONS  (STANDING):  acyclovir   Oral Tab/Cap 400 milliGRAM(s) Oral three times a day  chlorhexidine 4% Liquid 1 Application(s) Topical <User Schedule>  influenza   Vaccine 0.5 milliLiter(s) IntraMuscular once    MEDICATIONS  (PRN):  hydrocortisone sodium succinate Injectable 100 milliGRAM(s) IV Push once PRN rituximab reaction  sodium chloride 0.9% lock flush 10 milliLiter(s) IV Push every 1 hour PRN Pre/post blood products, medications, blood draw, and to maintain line patency      CAPILLARY BLOOD GLUCOSE      POCT Blood Glucose.: 83 mg/dL (03 Oct 2020 10:30)  POCT Blood Glucose.: 143 mg/dL (02 Oct 2020 21:34)  POCT Blood Glucose.: 139 mg/dL (02 Oct 2020 17:16)    I&O's Summary    02 Oct 2020 07:01  -  03 Oct 2020 07:00  --------------------------------------------------------  IN: 250 mL / OUT: 1050 mL / NET: -800 mL    03 Oct 2020 07:01  -  03 Oct 2020 15:59  --------------------------------------------------------  IN: 480 mL / OUT: 400 mL / NET: 80 mL        PHYSICAL EXAM:  Vital Signs Last 24 Hrs  T(C): 36.9 (03 Oct 2020 15:00), Max: 37 (03 Oct 2020 00:27)  T(F): 98.5 (03 Oct 2020 15:00), Max: 98.6 (03 Oct 2020 00:27)  HR: 89 (03 Oct 2020 15:00) (81 - 96)  BP: 114/73 (03 Oct 2020 15:00) (86/51 - 128/87)  BP(mean): --  RR: 16 (03 Oct 2020 15:00) (16 - 18)  SpO2: 98% (03 Oct 2020 15:00) (98% - 100%)  CONSTITUTIONAL: NAD, well-developed, well-groomed  EYES: EOMI; conjunctiva and sclera clear  ENMT: Moist oral mucosa, no pharyngeal injection or exudates + RIJ shiley in place  RESPIRATORY: Normal respiratory effort; lungs are clear to auscultation bilaterally  CARDIOVASCULAR: Regular rate and rhythm, normal S1 and S2, no murmur/rub/gallop; +slight non pitting lower extremity edema  ABDOMEN: Nontender to palpation, normoactive bowel sounds, no rebound/guarding; No hepatosplenomegaly  PSYCH: A+O to person, place, and time; affect appropriate  NEUROLOGY: moving all extremities; no gross sensory deficits   SKIN: No rashes; +bilateral scattered eccymoses  LABS:                        10.3   11.85 )-----------( 245      ( 03 Oct 2020 09:36 )             33.0     10-03    142  |  103  |  20  ----------------------------<  84  3.9   |  27  |  0.75    Ca    8.2<L>      03 Oct 2020 09:36  Mg     2.2     10-02    TPro  5.2<L>  /  Alb  3.4  /  TBili  0.1<L>  /  DBili  x   /  AST  17  /  ALT  13  /  AlkPhos  47  10-03    PT/INR - ( 02 Oct 2020 12:35 )   PT: 12.0 sec;   INR: 1.02 ratio         PTT - ( 02 Oct 2020 12:35 )  PTT:26.6 sec            RADIOLOGY & ADDITIONAL TESTS:  Results Reviewed:   Imaging Personally Reviewed:  Electrocardiogram Personally Reviewed:    COORDINATION OF CARE:  Care Discussed with Consultants/Other Providers [Y/N]:  Prior or Outpatient Records Reviewed [Y/N]:

## 2020-10-03 NOTE — PROGRESS NOTE ADULT - ASSESSMENT
24 F with hx of TTP (followed by Dr. Berman at [facility]) s/p 22 sessions of PLEX at [facility] in 2014 followed by 4 doses of weekly Rituxan presenting with platelet count of 21K concerning for relapse of TTP.     #Relapsed TTP:  - Patient was initially diagnosed at age 18 and was treated at [facility] with 22 sessions of PLEX followed by Rituximab  - Plt 21 on admission  - Peripheral smear with 3-5 schistocytes per HPF  - [id number] level pending  - PLASMIC score of 7 indicating high risk   - Today is day 5 of PLEX, patient with improvement of platelets into normal range today. Today is the last day of PLEX, then will monitor plts. Keep shiley in place until normal x 3 days post discontinuation of PLEX.   - s/p 3 doses solumedrol. 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   -s/p Rituxan on 9/30. Dose #1 of 4 weekly doses. Next dose due 10/7. Will keep her in house until completed that dose and if everything stable at that point will plan for d/c.   - check CBC w DIFF, retic count, CMP, LDH, haptoglobin daily. Hemoglobin slightly lower, will monitor.   - CTH negative for bleed  - Will see daily.

## 2020-10-04 LAB
ANION GAP SERPL CALC-SCNC: 10 MMOL/L — SIGNIFICANT CHANGE UP (ref 5–17)
BUN SERPL-MCNC: 14 MG/DL — SIGNIFICANT CHANGE UP (ref 7–23)
CALCIUM SERPL-MCNC: 8.9 MG/DL — SIGNIFICANT CHANGE UP (ref 8.4–10.5)
CHLORIDE SERPL-SCNC: 102 MMOL/L — SIGNIFICANT CHANGE UP (ref 96–108)
CO2 SERPL-SCNC: 26 MMOL/L — SIGNIFICANT CHANGE UP (ref 22–31)
CREAT SERPL-MCNC: 0.69 MG/DL — SIGNIFICANT CHANGE UP (ref 0.5–1.3)
GLUCOSE SERPL-MCNC: 78 MG/DL — SIGNIFICANT CHANGE UP (ref 70–99)
HAPTOGLOB SERPL-MCNC: 132 MG/DL — SIGNIFICANT CHANGE UP (ref 34–200)
HCT VFR BLD CALC: 34.1 % — LOW (ref 34.5–45)
HGB BLD-MCNC: 10.7 G/DL — LOW (ref 11.5–15.5)
LDH SERPL L TO P-CCNC: 189 U/L — SIGNIFICANT CHANGE UP (ref 50–242)
MCHC RBC-ENTMCNC: 26.9 PG — LOW (ref 27–34)
MCHC RBC-ENTMCNC: 31.4 GM/DL — LOW (ref 32–36)
MCV RBC AUTO: 85.7 FL — SIGNIFICANT CHANGE UP (ref 80–100)
NRBC # BLD: 0 /100 WBCS — SIGNIFICANT CHANGE UP (ref 0–0)
PLATELET # BLD AUTO: 337 K/UL — SIGNIFICANT CHANGE UP (ref 150–400)
POTASSIUM SERPL-MCNC: 4.3 MMOL/L — SIGNIFICANT CHANGE UP (ref 3.5–5.3)
POTASSIUM SERPL-SCNC: 4.3 MMOL/L — SIGNIFICANT CHANGE UP (ref 3.5–5.3)
RBC # BLD: 3.98 M/UL — SIGNIFICANT CHANGE UP (ref 3.8–5.2)
RBC # FLD: 16.2 % — HIGH (ref 10.3–14.5)
SODIUM SERPL-SCNC: 138 MMOL/L — SIGNIFICANT CHANGE UP (ref 135–145)
WBC # BLD: 15.41 K/UL — HIGH (ref 3.8–10.5)
WBC # FLD AUTO: 15.41 K/UL — HIGH (ref 3.8–10.5)

## 2020-10-04 PROCEDURE — 99233 SBSQ HOSP IP/OBS HIGH 50: CPT

## 2020-10-04 PROCEDURE — 99232 SBSQ HOSP IP/OBS MODERATE 35: CPT

## 2020-10-04 RX ORDER — ACETAMINOPHEN 500 MG
650 TABLET ORAL ONCE
Refills: 0 | Status: COMPLETED | OUTPATIENT
Start: 2020-10-04 | End: 2020-10-04

## 2020-10-04 RX ADMIN — CHLORHEXIDINE GLUCONATE 1 APPLICATION(S): 213 SOLUTION TOPICAL at 05:22

## 2020-10-04 RX ADMIN — Medication 400 MILLIGRAM(S): at 21:10

## 2020-10-04 RX ADMIN — Medication 400 MILLIGRAM(S): at 05:23

## 2020-10-04 RX ADMIN — Medication 650 MILLIGRAM(S): at 02:36

## 2020-10-04 RX ADMIN — Medication 650 MILLIGRAM(S): at 03:10

## 2020-10-04 RX ADMIN — Medication 400 MILLIGRAM(S): at 13:30

## 2020-10-04 NOTE — PROGRESS NOTE ADULT - SUBJECTIVE AND OBJECTIVE BOX
[facility] Division of Hospital Medicine  Nhan Gill MD  Pager (BERTO-JOHN, 9L-2E): [phone]  Other Times:  [phone]    Patient is a 24y old  Female who presents with a chief complaint of TTP exacerbation (04 Oct 2020 13:15)      SUBJECTIVE / OVERNIGHT EVENTS: Had episode of flushing and parasthesias during plex. Received benadryl w/ improvement. Today c/o discomfort from shiley. No bleeding or drainage. No fevers/chills. No abd pain. No urinary changes.    ADDITIONAL REVIEW OF SYSTEMS:  no cp, sob, n, v, diarrhea  MEDICATIONS  (STANDING):  acyclovir   Oral Tab/Cap 400 milliGRAM(s) Oral three times a day  chlorhexidine 4% Liquid 1 Application(s) Topical <User Schedule>  influenza   Vaccine 0.5 milliLiter(s) IntraMuscular once    MEDICATIONS  (PRN):  hydrocortisone sodium succinate Injectable 100 milliGRAM(s) IV Push once PRN rituximab reaction  sodium chloride 0.9% lock flush 10 milliLiter(s) IV Push every 1 hour PRN Pre/post blood products, medications, blood draw, and to maintain line patency      CAPILLARY BLOOD GLUCOSE      POCT Blood Glucose.: 127 mg/dL (04 Oct 2020 10:37)  POCT Blood Glucose.: 143 mg/dL (03 Oct 2020 21:21)  POCT Blood Glucose.: 118 mg/dL (03 Oct 2020 17:20)    I&O's Summary    03 Oct 2020 07:01  -  04 Oct 2020 07:00  --------------------------------------------------------  IN: 1360 mL / OUT: 2300 mL / NET: -940 mL    04 Oct 2020 07:01  -  04 Oct 2020 15:06  --------------------------------------------------------  IN: 320 mL / OUT: 920 mL / NET: -600 mL        PHYSICAL EXAM:  Vital Signs Last 24 Hrs  T(C): 36.8 (04 Oct 2020 14:27), Max: 36.9 (03 Oct 2020 18:31)  T(F): 98.3 (04 Oct 2020 14:27), Max: 98.4 (03 Oct 2020 18:31)  HR: 92 (04 Oct 2020 14:27) (71 - 102)  BP: 108/64 (04 Oct 2020 14:27) (99/68 - 121/70)  BP(mean): --  RR: 18 (04 Oct 2020 14:27) (16 - 18)  SpO2: 97% (04 Oct 2020 14:27) (97% - 99%)  CONSTITUTIONAL: NAD, well-developed, well-groomed  EYES: EOMI; conjunctiva and sclera clear  ENMT: Moist oral mucosa, no pharyngeal injection or exudates + [facility] shiley in place  RESPIRATORY: Normal respiratory effort; lungs are clear to auscultation bilaterally  CARDIOVASCULAR: Regular rate and rhythm, normal S1 and S2, no murmur/rub/gallop; +slight non pitting lower extremity edema  ABDOMEN: Nontender to palpation, normoactive bowel sounds, no rebound/guarding; No hepatosplenomegaly  PSYCH: A+O to person, place, and time; affect appropriate  NEUROLOGY: moving all extremities; no gross sensory deficits   SKIN: No rashes; +bilateral scattered eccymoses    LABS:                        10.4   14.28 )-----------( 262      ( 03 Oct 2020 18:32 )             32.2     10-04    138  |  102  |  14  ----------------------------<  78  4.3   |  26  |  0.69    Ca    8.9      04 Oct 2020 10:03    TPro  5.1<L>  /  Alb  3.4  /  TBili  0.1<L>  /  DBili  x   /  AST  20  /  ALT  16  /  AlkPhos  52  10-03                RADIOLOGY & ADDITIONAL TESTS:  Results Reviewed: PLT stable.  Imaging Personally Reviewed:  Electrocardiogram Personally Reviewed:    COORDINATION OF CARE:  Care Discussed with Consultants/Other Providers [Y/N]:  Prior or Outpatient Records Reviewed [Y/N]:

## 2020-10-04 NOTE — PROGRESS NOTE ADULT - ASSESSMENT
24F w hx of TTP (22 sessions of PLEX at [facility], high dose steroids, 4 doses Rituxan in 2014) p/w ecchymosis (spontaneous onset RUE 9/25), petechiae (onset BLE 9/27) , headaches,  and fatigue. Patient's platelet count 21k, , and peripheral blood smear w/ schistocytes. Pt admitted to MICU for urgent shiley and PLEX / pulse steroids for relapsed TTP now w/ improving platelet levels.

## 2020-10-04 NOTE — PROVIDER CONTACT NOTE (OTHER) - ASSESSMENT
pt. states headache started a couple of hours ago as a light headache and got worse.
VSS, afebrile, no s/s bleeding. last plt:262 910/3/2020 1800)
Vss. afebrile, no s/s acute distress

## 2020-10-04 NOTE — PROGRESS NOTE ADULT - ASSESSMENT
24 F with hx of TTP (followed by Dr. Berman at [facility]) s/p 22 sessions of PLEX at [facility] in 2014 followed by 4 doses of weekly Rituxan presenting with platelet count of 21K concerning for relapse of TTP.     #Relapsed TTP:  - Patient was initially diagnosed at age 18 and was treated at [facility] with 22 sessions of PLEX followed by Rituximab  - Plt 21 on admission  - Peripheral smear with 3-5 schistocytes per HPF  - [id number] level pending  - PLASMIC score of 7 indicating high risk   - Today is day 5 of PLEX, patient with improvement of platelets into normal range today. Today is the last day of PLEX, then will monitor plts. Keep shiley in place until normal x 3 days post discontinuation of PLEX.   - s/p 3 doses solumedrol. 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   -s/p Rituxan on 9/30. Dose #1 of 4 weekly doses. Next dose due 10/7. Will keep her in house until completed that dose and if everything stable at that point will plan for d/c.   - check CBC w DIFF, retic count, CMP, LDH, haptoglobin daily. Hemoglobin slightly lower, will monitor.   - CTH negative for bleed  - Will see daily. 24 F with hx of TTP (followed by Dr. Berman at [facility]) s/p 22 sessions of PLEX at [facility] in 2014 followed by 4 doses of weekly Rituxan presenting with platelet count of 21K concerning for relapse of TTP.     #Relapsed TTP:  - Patient was initially diagnosed at age 18 and was treated at [facility] with 22 sessions of PLEX followed by Rituximab  - Plt 21 on admission  - Peripheral smear with 3-5 schistocytes per HPF  - [id number] level pending  - PLASMIC score of 7 indicating high risk   -s/p 5 days of PLEX, patient with improvement of platelets into normal range. NO PLEX today. Will discuss need for taper with blood bank on monday   - s/p 3 doses solumedrol. 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   -s/p Rituxan on 9/30. Dose #1 of 4 weekly doses. Next dose due 10/7. Will keep her in house until completed that dose and if everything stable at that point will plan for d/c.   - check CBC w DIFF, retic count, CMP, LDH, haptoglobin daily. Hb stable   - CTH negative for bleed  - Will see daily.

## 2020-10-04 NOTE — CHART NOTE - NSCHARTNOTEFT_GEN_A_CORE
Headache    Patient c/o frontal headache 7/10 aching. Denies numbness, tingling, blurred vision, CP, dizziness, lightheadedness, visual  changes, Nausea, vomiting    `Vital Signs Last 24 Hrs  T(C): 36.7 (04 Oct 2020 04:37), Max: 36.9 (03 Oct 2020 13:35)  T(F): 98.1 (04 Oct 2020 04:37), Max: 98.5 (03 Oct 2020 15:00)  HR: 71 (04 Oct 2020 04:37) (71 - 102)  BP: 99/68 (04 Oct 2020 04:37) (86/51 - 121/70)  BP(mean): --  RR: 17 (04 Oct 2020 04:37) (16 - 18)  SpO2: 99% (04 Oct 2020 04:37) (98% - 99%)    NAD,   Non-focal deficits PERRLA moves all extremities strengh 5/5  s1s2 RRR  lungs cta no accessory muscle use                     10.4   14.28 )-----------( 262      ( 03 Oct 2020 18:32 )             32.2     10-03    139  |  100  |  18  ----------------------------<  135<H>  3.6   |  31  |  0.79    Ca    8.4      03 Oct 2020 18:32  Mg     2.2     10-02    TPro  5.1<L>  /  Alb  3.4  /  TBili  0.1<L>  /  DBili  x   /  AST  20  /  ALT  16  /  AlkPhos  52  10-03      HPI:  24F hx of TTP p/w bruising and petechiae. Spontaneous bruise on right arm occurred on Friday and petechiae appeared last night over legs bilaterally last night and patient also started seeing black spots in her vision and prompted her to come to ED. Pt denies fevers, chills, sudden weakness or changes in sensation in arms legs, no CP, no SOB, no abdominal pain, no urine or bowel issues.  (29 Sep 2020 01:24) With resolved frontal headache.    Plan  neuro assessment wnl  Tylenol 650 mg x1 one dose (headache relieved)    Will endorse to primary day NP, attending to follow    Senia Jain NP  [facility] [id number]

## 2020-10-04 NOTE — PROGRESS NOTE ADULT - SUBJECTIVE AND OBJECTIVE BOX
Chief Complaint: TTP    INTERVAL HPI/OVERNIGHT EVENTS: c/o HA overnight     MEDICATIONS  (STANDING):  acyclovir   Oral Tab/Cap 400 milliGRAM(s) Oral three times a day  chlorhexidine 4% Liquid 1 Application(s) Topical <User Schedule>  influenza   Vaccine 0.5 milliLiter(s) IntraMuscular once    MEDICATIONS  (PRN):  hydrocortisone sodium succinate Injectable 100 milliGRAM(s) IV Push once PRN rituximab reaction  sodium chloride 0.9% lock flush 10 milliLiter(s) IV Push every 1 hour PRN Pre/post blood products, medications, blood draw, and to maintain line patency      Allergies    No Known Allergies    Intolerances        ROS: as above     Vital Signs Last 24 Hrs  T(C): 36.7 (04 Oct 2020 10:00), Max: 36.9 (03 Oct 2020 13:35)  T(F): 98 (04 Oct 2020 10:00), Max: 98.5 (03 Oct 2020 15:00)  HR: 84 (04 Oct 2020 10:00) (71 - 102)  BP: 117/72 (04 Oct 2020 10:00) (86/51 - 121/70)  BP(mean): --  RR: 18 (04 Oct 2020 10:00) (16 - 18)  SpO2: 99% (04 Oct 2020 10:00) (98% - 99%)    Physical Exam:   AAO x 3, NAD   RRR S1S2  CTA b/l   soft NTNDBS+  no c/c/e    LABS:                        10.4   14.28 )-----------( 262      ( 03 Oct 2020 18:32 )             32.2     10-04    138  |  102  |  14  ----------------------------<  78  4.3   |  26  |  0.69    Ca    8.9      04 Oct 2020 10:03    TPro  5.1<L>  /  Alb  3.4  /  TBili  0.1<L>  /  DBili  x   /  AST  20  /  ALT  16  /  AlkPhos  52  10-03           Chief Complaint: TTP    INTERVAL HPI/OVERNIGHT EVENTS: c/o HA overnight due to lack of sleep. HA resolved this morning. Could not get CBC due to access issues     MEDICATIONS  (STANDING):  acyclovir   Oral Tab/Cap 400 milliGRAM(s) Oral three times a day  chlorhexidine 4% Liquid 1 Application(s) Topical <User Schedule>  influenza   Vaccine 0.5 milliLiter(s) IntraMuscular once    MEDICATIONS  (PRN):  hydrocortisone sodium succinate Injectable 100 milliGRAM(s) IV Push once PRN rituximab reaction  sodium chloride 0.9% lock flush 10 milliLiter(s) IV Push every 1 hour PRN Pre/post blood products, medications, blood draw, and to maintain line patency      Allergies    No Known Allergies    Intolerances        ROS: as above     Vital Signs Last 24 Hrs  T(C): 36.7 (04 Oct 2020 10:00), Max: 36.9 (03 Oct 2020 13:35)  T(F): 98 (04 Oct 2020 10:00), Max: 98.5 (03 Oct 2020 15:00)  HR: 84 (04 Oct 2020 10:00) (71 - 102)  BP: 117/72 (04 Oct 2020 10:00) (86/51 - 121/70)  BP(mean): --  RR: 18 (04 Oct 2020 10:00) (16 - 18)  SpO2: 99% (04 Oct 2020 10:00) (98% - 99%)    Physical Exam:   AAO x 3, NAD   RRR S1S2  CTA b/l   soft NTNDBS+  no c/c/e    LABS:                        10.4   14.28 )-----------( 262      ( 03 Oct 2020 18:32 )             32.2     10-04    138  |  102  |  14  ----------------------------<  78  4.3   |  26  |  0.69    Ca    8.9      04 Oct 2020 10:03    TPro  5.1<L>  /  Alb  3.4  /  TBili  0.1<L>  /  DBili  x   /  AST  20  /  ALT  16  /  AlkPhos  52  10-03

## 2020-10-05 LAB
BASOPHILS # BLD AUTO: 0.02 K/UL — SIGNIFICANT CHANGE UP (ref 0–0.2)
BASOPHILS NFR BLD AUTO: 0.2 % — SIGNIFICANT CHANGE UP (ref 0–2)
CA-I BLD-SCNC: 1.23 MMOL/L — SIGNIFICANT CHANGE UP (ref 1.12–1.3)
EOSINOPHIL # BLD AUTO: 0.22 K/UL — SIGNIFICANT CHANGE UP (ref 0–0.5)
EOSINOPHIL NFR BLD AUTO: 1.7 % — SIGNIFICANT CHANGE UP (ref 0–6)
HAPTOGLOB SERPL-MCNC: 178 MG/DL — SIGNIFICANT CHANGE UP (ref 34–200)
HCT VFR BLD CALC: 32.3 % — LOW (ref 34.5–45)
HGB BLD-MCNC: 10 G/DL — LOW (ref 11.5–15.5)
IMM GRANULOCYTES NFR BLD AUTO: 1.7 % — HIGH (ref 0–1.5)
LDH SERPL L TO P-CCNC: 186 U/L — SIGNIFICANT CHANGE UP (ref 50–242)
LYMPHOCYTES # BLD AUTO: 19.7 % — SIGNIFICANT CHANGE UP (ref 13–44)
LYMPHOCYTES # BLD AUTO: 2.52 K/UL — SIGNIFICANT CHANGE UP (ref 1–3.3)
MCHC RBC-ENTMCNC: 26.8 PG — LOW (ref 27–34)
MCHC RBC-ENTMCNC: 31 GM/DL — LOW (ref 32–36)
MCV RBC AUTO: 86.6 FL — SIGNIFICANT CHANGE UP (ref 80–100)
MONOCYTES # BLD AUTO: 0.44 K/UL — SIGNIFICANT CHANGE UP (ref 0–0.9)
MONOCYTES NFR BLD AUTO: 3.4 % — SIGNIFICANT CHANGE UP (ref 2–14)
NEUTROPHILS # BLD AUTO: 9.37 K/UL — HIGH (ref 1.8–7.4)
NEUTROPHILS NFR BLD AUTO: 73.3 % — SIGNIFICANT CHANGE UP (ref 43–77)
NRBC # BLD: 0 /100 WBCS — SIGNIFICANT CHANGE UP (ref 0–0)
PLATELET # BLD AUTO: 355 K/UL — SIGNIFICANT CHANGE UP (ref 150–400)
RBC # BLD: 3.73 M/UL — LOW (ref 3.8–5.2)
RBC # FLD: 16.1 % — HIGH (ref 10.3–14.5)
WBC # BLD: 12.79 K/UL — HIGH (ref 3.8–10.5)
WBC # FLD AUTO: 12.79 K/UL — HIGH (ref 3.8–10.5)

## 2020-10-05 PROCEDURE — 99232 SBSQ HOSP IP/OBS MODERATE 35: CPT | Mod: GC

## 2020-10-05 PROCEDURE — 99233 SBSQ HOSP IP/OBS HIGH 50: CPT

## 2020-10-05 RX ADMIN — CHLORHEXIDINE GLUCONATE 1 APPLICATION(S): 213 SOLUTION TOPICAL at 05:03

## 2020-10-05 RX ADMIN — Medication 400 MILLIGRAM(S): at 05:03

## 2020-10-05 RX ADMIN — Medication 400 MILLIGRAM(S): at 22:32

## 2020-10-05 RX ADMIN — Medication 400 MILLIGRAM(S): at 13:24

## 2020-10-05 NOTE — PROGRESS NOTE ADULT - SUBJECTIVE AND OBJECTIVE BOX
Patient is a 24y old  Female who presents with a chief complaint of TTP exacerbation (05 Oct 2020 10:49)      SUBJECTIVE / OVERNIGHT EVENTS:    MEDICATIONS  (STANDING):  acyclovir   Oral Tab/Cap 400 milliGRAM(s) Oral three times a day  chlorhexidine 4% Liquid 1 Application(s) Topical <User Schedule>  influenza   Vaccine 0.5 milliLiter(s) IntraMuscular once    MEDICATIONS  (PRN):  hydrocortisone sodium succinate Injectable 100 milliGRAM(s) IV Push once PRN rituximab reaction  sodium chloride 0.9% lock flush 10 milliLiter(s) IV Push every 1 hour PRN Pre/post blood products, medications, blood draw, and to maintain line patency      Vital Signs Last 24 Hrs  T(C): 36.8 (05 Oct 2020 07:43), Max: 36.9 (05 Oct 2020 00:09)  T(F): 98.3 (05 Oct 2020 07:43), Max: 98.4 (05 Oct 2020 00:09)  HR: 93 (05 Oct 2020 07:43) (83 - 96)  BP: 102/68 (05 Oct 2020 07:43) (97/62 - 121/82)  BP(mean): --  RR: 18 (05 Oct 2020 07:43) (18 - 18)  SpO2: 98% (05 Oct 2020 07:43) (97% - 98%)  CAPILLARY BLOOD GLUCOSE      POCT Blood Glucose.: 100 mg/dL (05 Oct 2020 08:26)  POCT Blood Glucose.: 130 mg/dL (04 Oct 2020 21:09)  POCT Blood Glucose.: 131 mg/dL (04 Oct 2020 17:15)    I&O's Summary    04 Oct 2020 07:01  -  05 Oct 2020 07:00  --------------------------------------------------------  IN: 760 mL / OUT: 1320 mL / NET: -560 mL        PHYSICAL EXAM:  GENERAL: NAD, well-developed  HEAD:  Atraumatic, Normocephalic  EYES: EOMI, PERRLA, conjunctiva and sclera clear  NECK: Supple, No JVD  CHEST/LUNG: Clear to auscultation bilaterally; No wheeze  HEART: Regular rate and rhythm; No murmurs, rubs, or gallops  ABDOMEN: Soft, Nontender, Nondistended; Bowel sounds present  EXTREMITIES:  2+ Peripheral Pulses, No clubbing, cyanosis, or edema  PSYCH: AAOx3  NEUROLOGY: non-focal  SKIN: No rashes or lesions    LABS:                        10.0   12.79 )-----------( 355      ( 05 Oct 2020 09:05 )             32.3     10-04    138  |  102  |  14  ----------------------------<  78  4.3   |  26  |  0.69    Ca    8.9      04 Oct 2020 10:03    TPro  5.1<L>  /  Alb  3.4  /  TBili  0.1<L>  /  DBili  x   /  AST  20  /  ALT  16  /  AlkPhos  52  10-03              RADIOLOGY & ADDITIONAL TESTS:    Imaging Personally Reviewed:    Consultant(s) Notes Reviewed:      Care Discussed with Consultants/Other Providers:   Patient is a 24y old  Female who presents with a chief complaint of TTP exacerbation (05 Oct 2020 10:49)    SUBJECTIVE / OVERNIGHT EVENTS: no acute events overnight     MEDICATIONS  (STANDING):  acyclovir   Oral Tab/Cap 400 milliGRAM(s) Oral three times a day  chlorhexidine 4% Liquid 1 Application(s) Topical <User Schedule>  influenza   Vaccine 0.5 milliLiter(s) IntraMuscular once    MEDICATIONS  (PRN):  hydrocortisone sodium succinate Injectable 100 milliGRAM(s) IV Push once PRN rituximab reaction  sodium chloride 0.9% lock flush 10 milliLiter(s) IV Push every 1 hour PRN Pre/post blood products, medications, blood draw, and to maintain line patency      Vital Signs Last 24 Hrs  T(C): 36.8 (05 Oct 2020 07:43), Max: 36.9 (05 Oct 2020 00:09)  T(F): 98.3 (05 Oct 2020 07:43), Max: 98.4 (05 Oct 2020 00:09)  HR: 93 (05 Oct 2020 07:43) (83 - 96)  BP: 102/68 (05 Oct 2020 07:43) (97/62 - 121/82)  BP(mean): --  RR: 18 (05 Oct 2020 07:43) (18 - 18)  SpO2: 98% (05 Oct 2020 07:43) (97% - 98%)  CAPILLARY BLOOD GLUCOSE      POCT Blood Glucose.: 100 mg/dL (05 Oct 2020 08:26)  POCT Blood Glucose.: 130 mg/dL (04 Oct 2020 21:09)  POCT Blood Glucose.: 131 mg/dL (04 Oct 2020 17:15)    I&O's Summary    04 Oct 2020 07:01  -  05 Oct 2020 07:00  --------------------------------------------------------  IN: 760 mL / OUT: 1320 mL / NET: -560 mL        PHYSICAL EXAM:  GENERAL: NAD  EYES: conjunctiva and sclera clear  NECK: Supple, No JVD  CHEST/LUNG: Clear to auscultation bilaterally; No wheeze  HEART: +s1/S2, reg   ABDOMEN: Soft, Nontender, Nondistended  EXTREMITIES: no LE edema   PSYCH: AAOx3      LABS:                        10.0   12.79 )-----------( 355      ( 05 Oct 2020 09:05 )             32.3     10-04    138  |  102  |  14  ----------------------------<  78  4.3   |  26  |  0.69    Ca    8.9      04 Oct 2020 10:03    TPro  5.1<L>  /  Alb  3.4  /  TBili  0.1<L>  /  DBili  x   /  AST  20  /  ALT  16  /  AlkPhos  52  10-03

## 2020-10-05 NOTE — PROGRESS NOTE ADULT - ASSESSMENT
24F w hx of TTP (22 sessions of PLEX at [facility], high dose steroids, 4 doses Rituxan in 2014) p/w ecchymosis (spontaneous onset RUE 9/25), petechiae (onset BLE 9/27) , headaches,  and fatigue. Patient's platelet count 21k, , and peripheral blood smear w/ schistocytes. Pt admitted to MICU for urgent shiley and PLEX / pulse steroids for relapsed TTP now w/ improving platelet levels.

## 2020-10-05 NOTE — PROGRESS NOTE ADULT - SUBJECTIVE AND OBJECTIVE BOX
Afebrile overnight.     Vital Signs Last 24 Hrs  T(C): 36.8 (05 Oct 2020 07:43), Max: 36.9 (05 Oct 2020 00:09)  T(F): 98.3 (05 Oct 2020 07:43), Max: 98.4 (05 Oct 2020 00:09)  HR: 93 (05 Oct 2020 07:43) (83 - 96)  BP: 102/68 (05 Oct 2020 07:43) (97/62 - 121/82)  BP(mean): --  RR: 18 (05 Oct 2020 07:43) (18 - 18)  SpO2: 98% (05 Oct 2020 07:43) (97% - 98%)  PHYSICAL EXAM:    GENERAL: NAD, AAOx3   HEAD:  NC/AT  EYES: EOMI, PERRLA, no scleral icterus  HEENT: Moist mucous membranes  LUNG: Clear to auscultation bilaterally; No rales, rhonchi, wheezing, or rubs  HEART: RRR; No murmurs, rubs, or gallops  ABDOMEN: +BS, ST/ND/NT  EXTREMITIES:  2+ Peripheral Pulses, No clubbing, cyanosis, or edema  LAD: no palpable adenopathy  10-04    138  |  102  |  14  ----------------------------<  78  4.3   |  26  |  0.69    Ca    8.9      04 Oct 2020 10:03    TPro  5.1<L>  /  Alb  3.4  /  TBili  0.1<L>  /  DBili  x   /  AST  20  /  ALT  16  /  AlkPhos  52  10-03      CBC Full  -  ( 05 Oct 2020 09:05 )  WBC Count : 12.79 K/uL  RBC Count : 3.73 M/uL  Hemoglobin : 10.0 g/dL  Hematocrit : 32.3 %  Platelet Count - Automated : 355 K/uL  Mean Cell Volume : 86.6 fl  Mean Cell Hemoglobin : 26.8 pg  Mean Cell Hemoglobin Concentration : 31.0 gm/dL  Auto Neutrophil # : 9.37 K/uL  Auto Lymphocyte # : 2.52 K/uL  Auto Monocyte # : 0.44 K/uL  Auto Eosinophil # : 0.22 K/uL  Auto Basophil # : 0.02 K/uL  Auto Neutrophil % : 73.3 %  Auto Lymphocyte % : 19.7 %  Auto Monocyte % : 3.4 %  Auto Eosinophil % : 1.7 %  Auto Basophil % : 0.2 %      LIVER FUNCTIONS - ( 03 Oct 2020 18:32 )  Alb: 3.4 g/dL / Pro: 5.1 g/dL / ALK PHOS: 52 U/L / ALT: 16 U/L / AST: 20 U/L / GGT: x                 Lactate Dehydrogenase, Serum: 186 U/L (10-05-20 @ 09:05)             Afebrile overnight.   plts normal.   No PLEX today  Sitting in bed, using laptop  Denies headaches NV    Vital Signs Last 24 Hrs  T(C): 36.8 (05 Oct 2020 07:43), Max: 36.9 (05 Oct 2020 00:09)  T(F): 98.3 (05 Oct 2020 07:43), Max: 98.4 (05 Oct 2020 00:09)  HR: 93 (05 Oct 2020 07:43) (83 - 96)  BP: 102/68 (05 Oct 2020 07:43) (97/62 - 121/82)  BP(mean): --  RR: 18 (05 Oct 2020 07:43) (18 - 18)  SpO2: 98% (05 Oct 2020 07:43) (97% - 98%)  PHYSICAL EXAM:    GENERAL: NAD, AAOx3   HEAD:  NC/AT  EYES: EOMI, PERRLA, no scleral icterus  HEENT: Moist mucous membranes  LUNG: Clear to auscultation bilaterally; No rales, rhonchi, wheezing, or rubs  HEART: RRR; No murmurs, rubs, or gallops  ABDOMEN: +BS, ST/ND/NT  EXTREMITIES:  2+ Peripheral Pulses, No clubbing, cyanosis, or edema  LAD: no palpable adenopathy  10-04    138  |  102  |  14  ----------------------------<  78  4.3   |  26  |  0.69    Ca    8.9      04 Oct 2020 10:03    TPro  5.1<L>  /  Alb  3.4  /  TBili  0.1<L>  /  DBili  x   /  AST  20  /  ALT  16  /  AlkPhos  52  10-03      CBC Full  -  ( 05 Oct 2020 09:05 )  WBC Count : 12.79 K/uL  RBC Count : 3.73 M/uL  Hemoglobin : 10.0 g/dL  Hematocrit : 32.3 %  Platelet Count - Automated : 355 K/uL  Mean Cell Volume : 86.6 fl  Mean Cell Hemoglobin : 26.8 pg  Mean Cell Hemoglobin Concentration : 31.0 gm/dL  Auto Neutrophil # : 9.37 K/uL  Auto Lymphocyte # : 2.52 K/uL  Auto Monocyte # : 0.44 K/uL  Auto Eosinophil # : 0.22 K/uL  Auto Basophil # : 0.02 K/uL  Auto Neutrophil % : 73.3 %  Auto Lymphocyte % : 19.7 %  Auto Monocyte % : 3.4 %  Auto Eosinophil % : 1.7 %  Auto Basophil % : 0.2 %      LIVER FUNCTIONS - ( 03 Oct 2020 18:32 )  Alb: 3.4 g/dL / Pro: 5.1 g/dL / ALK PHOS: 52 U/L / ALT: 16 U/L / AST: 20 U/L / GGT: x                 Lactate Dehydrogenase, Serum: 186 U/L (10-05-20 @ 09:05)

## 2020-10-06 ENCOUNTER — TRANSCRIPTION ENCOUNTER (OUTPATIENT)
Age: 25
End: 2020-10-06

## 2020-10-06 LAB
ALBUMIN SERPL ELPH-MCNC: 4.3 G/DL — SIGNIFICANT CHANGE UP (ref 3.3–5)
ALP SERPL-CCNC: 62 U/L — SIGNIFICANT CHANGE UP (ref 40–120)
ALT FLD-CCNC: 15 U/L — SIGNIFICANT CHANGE UP (ref 10–45)
ANION GAP SERPL CALC-SCNC: 13 MMOL/L — SIGNIFICANT CHANGE UP (ref 5–17)
AST SERPL-CCNC: 15 U/L — SIGNIFICANT CHANGE UP (ref 10–40)
BASOPHILS # BLD AUTO: 0.03 K/UL — SIGNIFICANT CHANGE UP (ref 0–0.2)
BASOPHILS NFR BLD AUTO: 0.2 % — SIGNIFICANT CHANGE UP (ref 0–2)
BILIRUB SERPL-MCNC: 0.2 MG/DL — SIGNIFICANT CHANGE UP (ref 0.2–1.2)
BUN SERPL-MCNC: 16 MG/DL — SIGNIFICANT CHANGE UP (ref 7–23)
CALCIUM SERPL-MCNC: 9.5 MG/DL — SIGNIFICANT CHANGE UP (ref 8.4–10.5)
CHLORIDE SERPL-SCNC: 101 MMOL/L — SIGNIFICANT CHANGE UP (ref 96–108)
CO2 SERPL-SCNC: 23 MMOL/L — SIGNIFICANT CHANGE UP (ref 22–31)
CREAT SERPL-MCNC: 0.7 MG/DL — SIGNIFICANT CHANGE UP (ref 0.5–1.3)
EOSINOPHIL # BLD AUTO: 0.24 K/UL — SIGNIFICANT CHANGE UP (ref 0–0.5)
EOSINOPHIL NFR BLD AUTO: 1.9 % — SIGNIFICANT CHANGE UP (ref 0–6)
GLUCOSE SERPL-MCNC: 96 MG/DL — SIGNIFICANT CHANGE UP (ref 70–99)
HAPTOGLOB SERPL-MCNC: 242 MG/DL — HIGH (ref 34–200)
HCT VFR BLD CALC: 35.8 % — SIGNIFICANT CHANGE UP (ref 34.5–45)
HGB BLD-MCNC: 11 G/DL — LOW (ref 11.5–15.5)
IMM GRANULOCYTES NFR BLD AUTO: 2.3 % — HIGH (ref 0–1.5)
LDH SERPL L TO P-CCNC: 178 U/L — SIGNIFICANT CHANGE UP (ref 50–242)
LYMPHOCYTES # BLD AUTO: 19.1 % — SIGNIFICANT CHANGE UP (ref 13–44)
LYMPHOCYTES # BLD AUTO: 2.41 K/UL — SIGNIFICANT CHANGE UP (ref 1–3.3)
MCHC RBC-ENTMCNC: 26.7 PG — LOW (ref 27–34)
MCHC RBC-ENTMCNC: 30.7 GM/DL — LOW (ref 32–36)
MCV RBC AUTO: 86.9 FL — SIGNIFICANT CHANGE UP (ref 80–100)
MONOCYTES # BLD AUTO: 0.48 K/UL — SIGNIFICANT CHANGE UP (ref 0–0.9)
MONOCYTES NFR BLD AUTO: 3.8 % — SIGNIFICANT CHANGE UP (ref 2–14)
NEUTROPHILS # BLD AUTO: 9.19 K/UL — HIGH (ref 1.8–7.4)
NEUTROPHILS NFR BLD AUTO: 72.7 % — SIGNIFICANT CHANGE UP (ref 43–77)
NRBC # BLD: 0 /100 WBCS — SIGNIFICANT CHANGE UP (ref 0–0)
PLATELET # BLD AUTO: 418 K/UL — HIGH (ref 150–400)
POTASSIUM SERPL-MCNC: 4.5 MMOL/L — SIGNIFICANT CHANGE UP (ref 3.5–5.3)
POTASSIUM SERPL-SCNC: 4.5 MMOL/L — SIGNIFICANT CHANGE UP (ref 3.5–5.3)
PROT SERPL-MCNC: 6.8 G/DL — SIGNIFICANT CHANGE UP (ref 6–8.3)
RBC # BLD: 4.12 M/UL — SIGNIFICANT CHANGE UP (ref 3.8–5.2)
RBC # FLD: 16.2 % — HIGH (ref 10.3–14.5)
SODIUM SERPL-SCNC: 137 MMOL/L — SIGNIFICANT CHANGE UP (ref 135–145)
WBC # BLD: 12.64 K/UL — HIGH (ref 3.8–10.5)
WBC # FLD AUTO: 12.64 K/UL — HIGH (ref 3.8–10.5)

## 2020-10-06 PROCEDURE — 99233 SBSQ HOSP IP/OBS HIGH 50: CPT

## 2020-10-06 PROCEDURE — 36514 APHERESIS PLASMA: CPT

## 2020-10-06 PROCEDURE — 99232 SBSQ HOSP IP/OBS MODERATE 35: CPT | Mod: GC

## 2020-10-06 RX ORDER — DIPHENHYDRAMINE HCL 50 MG
50 CAPSULE ORAL ONCE
Refills: 0 | Status: COMPLETED | OUTPATIENT
Start: 2020-10-06 | End: 2020-10-06

## 2020-10-06 RX ORDER — DIPHENHYDRAMINE HCL 50 MG
50 CAPSULE ORAL ONCE
Refills: 0 | Status: DISCONTINUED | OUTPATIENT
Start: 2020-10-06 | End: 2020-10-06

## 2020-10-06 RX ADMIN — Medication 50 MILLIGRAM(S): at 17:04

## 2020-10-06 RX ADMIN — Medication 400 MILLIGRAM(S): at 13:18

## 2020-10-06 RX ADMIN — Medication 400 MILLIGRAM(S): at 22:22

## 2020-10-06 RX ADMIN — Medication 400 MILLIGRAM(S): at 05:36

## 2020-10-06 RX ADMIN — CHLORHEXIDINE GLUCONATE 1 APPLICATION(S): 213 SOLUTION TOPICAL at 08:14

## 2020-10-06 NOTE — DISCHARGE NOTE PROVIDER - NSDCFUADDAPPT_GEN_ALL_CORE_FT
Follow up with your hematology with 48 hours of discharge. You are schedule for an appointment in the [facility] on 10/13/20 with Dr. Medina    You are scheduled for rituxan on 10/14/2020 at [facility] at 12pm.

## 2020-10-06 NOTE — DISCHARGE NOTE PROVIDER - NSDCMRMEDTOKEN_GEN_ALL_CORE_FT
iron polysaccharide:  orally   Keflex 500 mg oral capsule: 1 cap(s) orally 4 times a day    acetaminophen 325 mg oral tablet: 2 tab(s) orally every 6 hours, As needed, Temp greater or equal to 38C (100.4F), Mild Pain (1 - 3)  iron polysaccharide:  orally    acetaminophen 325 mg oral tablet: 2 tab(s) orally every 6 hours, As needed, Temp greater or equal to 38C (100.4F), Mild Pain (1 - 3)  acyclovir 400 mg oral tablet: 1 tab(s) orally 3 times a day for 2 more days

## 2020-10-06 NOTE — DISCHARGE NOTE PROVIDER - NSDCCPCAREPLAN_GEN_ALL_CORE_FT
PRINCIPAL DISCHARGE DIAGNOSIS  Diagnosis: TTP (thrombotic thrombopenic purpura)  Assessment and Plan of Treatment: acute TTP  hematology consult  s/p PLEX  platlets improved -- at discharge is 290  To follow up on 10/13/2020 at [facility] with Dr. Medina at 9am.  Planned for rituxan on 10/14/2020 at 12pm.      SECONDARY DISCHARGE DIAGNOSES  Diagnosis: Herpes labialis without complication  Assessment and Plan of Treatment: on acyclovir 400mg BID   will complete 10 day course  rx sent for 2 more days    Diagnosis: Headache  Assessment and Plan of Treatment: resolved  tylenol prn

## 2020-10-06 NOTE — DISCHARGE NOTE PROVIDER - NSDCFUSCHEDAPPT_GEN_ALL_CORE_FT
[facility] ; 10/13/2020 ; MELVIN [facility]  [facility] ; 10/14/2020 ; [facility]  [facility] ; 10/14/2020 ; M[facility]VIN [facility]

## 2020-10-06 NOTE — PROGRESS NOTE ADULT - ASSESSMENT
24 F with hx of TTP (followed by Dr. Berman at [facility]) s/p 22 sessions of PLEX at [facility] in 2014 followed by 4 doses of weekly Rituxan presenting with platelet count of 21K concerning for relapse of TTP.     #Relapsed TTP:  - Patient was initially diagnosed at age 18 and was treated at [facility] with 22 sessions of PLEX followed by Rituximab  - Plt 21 on admission  - Peripheral smear with 3-5 schistocytes per HPF  - [id number] < 2, antibody level 83  - s/p 5 days of PLEX, patient with improvement of platelets into normal range. NO PLEX since 10/4. f/u blood bank recommendations today- Likely PLEX today  - s/p 3 doses solumedrol. 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   - s/p Rituxan on 9/30. Dose #1 of 4 weekly doses. Next dose due 10/7. Will keep her in house until completed that dose and if everything stable at that point will plan for d/c. Pt concerned that her counts drop from 250 to 20 in one day and wants to be monitored closely. Plan to DC home friday and CBC/MD appt at [facility] on monday  - check CBC w DIFF, retic count, CMP, LDH, haptoglobin daily. Hb stable   - CTH negative for bleed  - Will see daily.   - NExt dose of rituxan planned for 10/7. Pt wishes to f/u at [facility]. Will start appt process so that she has appt next week

## 2020-10-06 NOTE — PROGRESS NOTE ADULT - SUBJECTIVE AND OBJECTIVE BOX
Patient is a 24y old  Female who presents with a chief complaint of TTP exacerbation (05 Oct 2020 13:13)    SUBJECTIVE / OVERNIGHT EVENTS: no acute events overnight     MEDICATIONS  (STANDING):  acyclovir   Oral Tab/Cap 400 milliGRAM(s) Oral three times a day  chlorhexidine 4% Liquid 1 Application(s) Topical <User Schedule>  influenza   Vaccine 0.5 milliLiter(s) IntraMuscular once    MEDICATIONS  (PRN):  hydrocortisone sodium succinate Injectable 100 milliGRAM(s) IV Push once PRN rituximab reaction  sodium chloride 0.9% lock flush 10 milliLiter(s) IV Push every 1 hour PRN Pre/post blood products, medications, blood draw, and to maintain line patency      Vital Signs Last 24 Hrs  T(C): 36.4 (06 Oct 2020 07:40), Max: 37.1 (06 Oct 2020 00:13)  T(F): 97.6 (06 Oct 2020 07:40), Max: 98.8 (06 Oct 2020 00:13)  HR: 106 (06 Oct 2020 07:40) (73 - 106)  BP: 115/76 (06 Oct 2020 07:40) (110/71 - 128/82)  BP(mean): --  RR: 18 (06 Oct 2020 07:40) (18 - 18)  SpO2: 98% (06 Oct 2020 07:40) (97% - 99%)  CAPILLARY BLOOD GLUCOSE        I&O's Summary    05 Oct 2020 07:01  -  06 Oct 2020 07:00  --------------------------------------------------------  IN: 610 mL / OUT: 0 mL / NET: 610 mL    06 Oct 2020 07:01  -  06 Oct 2020 12:01  --------------------------------------------------------  IN: 0 mL / OUT: 800 mL / NET: -800 mL        PHYSICAL EXAM:  GENERAL: NAD  EYES:  conjunctiva and sclera clear  CHEST/LUNG: Clear to auscultation bilaterally; No wheeze  HEART: +S1/S2  ABDOMEN: Soft, Nontender, Nondistended  EXTREMITIES: no LE edema   PSYCH: AAOx3      LABS:                        11.0   12.64 )-----------( 418      ( 06 Oct 2020 11:03 )             35.8     10-06    137  |  101  |  16  ----------------------------<  96  4.5   |  23  |  0.70    Ca    9.5      06 Oct 2020 11:03    TPro  6.8  /  Alb  4.3  /  TBili  0.2  /  DBili  x   /  AST  15  /  ALT  15  /  AlkPhos  62  10-06

## 2020-10-06 NOTE — PROGRESS NOTE ADULT - SUBJECTIVE AND OBJECTIVE BOX
INTERVAL HPI/OVERNIGHT EVENTS:  Patient S&E at bedside. No o/n events, patient resting comfortably. No complaints at this time. Patient denies fever, chills, dizziness, weakness, CP, palpitations, SOB, cough, N/V/D/C, dysuria, changes in bowel movements, LE edema.    VITAL SIGNS:  T(F): 97.6 (10-06-20 @ 07:40)  HR: 106 (10-06-20 @ 07:40)  BP: 115/76 (10-06-20 @ 07:40)  RR: 18 (10-06-20 @ 07:40)  SpO2: 98% (10-06-20 @ 07:40)  Wt(kg): --    PHYSICAL EXAM:    Constitutional: NAD  Eyes: EOMI, sclera non-icteric  Neck: supple, no masses, no JVD  Respiratory: CTA b/l, good air entry b/l  Cardiovascular: RRR, no M/R/G  Gastrointestinal: soft, NTND, no masses palpable, + BS, no hepatosplenomegaly  Extremities: no c/c/e  Neurological: AAOx3      MEDICATIONS  (STANDING):  acyclovir   Oral Tab/Cap 400 milliGRAM(s) Oral three times a day  chlorhexidine 4% Liquid 1 Application(s) Topical <User Schedule>  influenza   Vaccine 0.5 milliLiter(s) IntraMuscular once    MEDICATIONS  (PRN):  hydrocortisone sodium succinate Injectable 100 milliGRAM(s) IV Push once PRN rituximab reaction  sodium chloride 0.9% lock flush 10 milliLiter(s) IV Push every 1 hour PRN Pre/post blood products, medications, blood draw, and to maintain line patency      Allergies    No Known Allergies    Intolerances        LABS:                        11.0   12.64 )-----------( 418      ( 06 Oct 2020 11:03 )             35.8     10-06    137  |  101  |  16  ----------------------------<  96  4.5   |  23  |  0.70    Ca    9.5      06 Oct 2020 11:03    TPro  6.8  /  Alb  4.3  /  TBili  0.2  /  DBili  x   /  AST  15  /  ALT  15  /  AlkPhos  62  10-06          RADIOLOGY & ADDITIONAL TESTS:  Studies reviewed.    ASSESSMENT & PLAN:

## 2020-10-06 NOTE — DISCHARGE NOTE PROVIDER - HOSPITAL COURSE
24F hx of TTP p/w bruising and petechiae. Spontaneous bruise on right arm occurred on Friday and petechiae appeared last night over legs bilaterally last night and patient also started seeing black spots in her vision and prompted her to come to ED. Pt denies fevers, chills, sudden weakness or changes in sensation in arms legs, no CP, no SOB, no abdominal pain, no urine or bowel issues.     Patient was admitted to ICU for emergent placement of Shiley and PLEX administration.  CT head on admission was negative. Hematology was consulted.  Patient also received rituxan on 10/7.  Patient's platelets improved.  Patient cleared for discharge bv hematology.  To follow up on 10/13/20 with Dr. Median.  Plant for rituxan on 10/14/20.  Patient was also started on acyclovir for herpes labialis.     Discharge/Dispo/Med rec discussed with attending.   Patient cleared for discharge with outpatient as above and with PCP within 1 week of discharge.

## 2020-10-06 NOTE — DISCHARGE NOTE PROVIDER - CARE PROVIDER_API CALL
Jayce Medina (MD; PhD)  Hematology; Internal Medicine; Oncology  [address] [place] [address]  Phone: [phone]  Fax: [phone]  Scheduled Appointment: 10/13/2020 09:00 AM

## 2020-10-06 NOTE — PROGRESS NOTE ADULT - ASSESSMENT
24F w hx of TTP (22 sessions of PLEX at [facility], high dose steroids, 4 doses Rituxan in 2014) p/w ecchymosis (spontaneous onset RUE 9/25), petechiae (onset BLE 9/27) , headaches,  and fatigue. Patient's platelet count 21k, , and peripheral blood smear w/ schistocytes. Pt admitted to MICU for urgent shiley and PLEX / pulse steroids for relapsed TTP now w/ improving platelet levels.

## 2020-10-07 LAB
ANION GAP SERPL CALC-SCNC: 13 MMOL/L — SIGNIFICANT CHANGE UP (ref 5–17)
BASOPHILS # BLD AUTO: 0.03 K/UL — SIGNIFICANT CHANGE UP (ref 0–0.2)
BASOPHILS NFR BLD AUTO: 0.2 % — SIGNIFICANT CHANGE UP (ref 0–2)
BUN SERPL-MCNC: 17 MG/DL — SIGNIFICANT CHANGE UP (ref 7–23)
CALCIUM SERPL-MCNC: 9.4 MG/DL — SIGNIFICANT CHANGE UP (ref 8.4–10.5)
CHLORIDE SERPL-SCNC: 104 MMOL/L — SIGNIFICANT CHANGE UP (ref 96–108)
CO2 SERPL-SCNC: 24 MMOL/L — SIGNIFICANT CHANGE UP (ref 22–31)
CREAT SERPL-MCNC: 0.63 MG/DL — SIGNIFICANT CHANGE UP (ref 0.5–1.3)
EOSINOPHIL # BLD AUTO: 0.21 K/UL — SIGNIFICANT CHANGE UP (ref 0–0.5)
EOSINOPHIL NFR BLD AUTO: 1.7 % — SIGNIFICANT CHANGE UP (ref 0–6)
GLUCOSE SERPL-MCNC: 93 MG/DL — SIGNIFICANT CHANGE UP (ref 70–99)
HCT VFR BLD CALC: 33.2 % — LOW (ref 34.5–45)
HGB BLD-MCNC: 10.5 G/DL — LOW (ref 11.5–15.5)
IMM GRANULOCYTES NFR BLD AUTO: 2 % — HIGH (ref 0–1.5)
LYMPHOCYTES # BLD AUTO: 2.97 K/UL — SIGNIFICANT CHANGE UP (ref 1–3.3)
LYMPHOCYTES # BLD AUTO: 23.6 % — SIGNIFICANT CHANGE UP (ref 13–44)
MCHC RBC-ENTMCNC: 27.3 PG — SIGNIFICANT CHANGE UP (ref 27–34)
MCHC RBC-ENTMCNC: 31.6 GM/DL — LOW (ref 32–36)
MCV RBC AUTO: 86.2 FL — SIGNIFICANT CHANGE UP (ref 80–100)
MONOCYTES # BLD AUTO: 0.7 K/UL — SIGNIFICANT CHANGE UP (ref 0–0.9)
MONOCYTES NFR BLD AUTO: 5.6 % — SIGNIFICANT CHANGE UP (ref 2–14)
NEUTROPHILS # BLD AUTO: 8.42 K/UL — HIGH (ref 1.8–7.4)
NEUTROPHILS NFR BLD AUTO: 66.9 % — SIGNIFICANT CHANGE UP (ref 43–77)
NRBC # BLD: 0 /100 WBCS — SIGNIFICANT CHANGE UP (ref 0–0)
PLATELET # BLD AUTO: 368 K/UL — SIGNIFICANT CHANGE UP (ref 150–400)
POTASSIUM SERPL-MCNC: 4.3 MMOL/L — SIGNIFICANT CHANGE UP (ref 3.5–5.3)
POTASSIUM SERPL-SCNC: 4.3 MMOL/L — SIGNIFICANT CHANGE UP (ref 3.5–5.3)
RBC # BLD: 3.85 M/UL — SIGNIFICANT CHANGE UP (ref 3.8–5.2)
RBC # FLD: 16.4 % — HIGH (ref 10.3–14.5)
SODIUM SERPL-SCNC: 141 MMOL/L — SIGNIFICANT CHANGE UP (ref 135–145)
WBC # BLD: 12.58 K/UL — HIGH (ref 3.8–10.5)
WBC # FLD AUTO: 12.58 K/UL — HIGH (ref 3.8–10.5)

## 2020-10-07 PROCEDURE — 99233 SBSQ HOSP IP/OBS HIGH 50: CPT

## 2020-10-07 PROCEDURE — 99232 SBSQ HOSP IP/OBS MODERATE 35: CPT | Mod: GC

## 2020-10-07 RX ORDER — HYDROCORTISONE 20 MG
100 TABLET ORAL ONCE
Refills: 0 | Status: COMPLETED | OUTPATIENT
Start: 2020-10-07 | End: 2020-10-07

## 2020-10-07 RX ORDER — DIPHENHYDRAMINE HCL 50 MG
50 CAPSULE ORAL ONCE
Refills: 0 | Status: COMPLETED | OUTPATIENT
Start: 2020-10-07 | End: 2020-10-07

## 2020-10-07 RX ORDER — ACETAMINOPHEN 500 MG
650 TABLET ORAL ONCE
Refills: 0 | Status: COMPLETED | OUTPATIENT
Start: 2020-10-07 | End: 2020-10-07

## 2020-10-07 RX ORDER — RITUXIMAB 10 MG/ML
878 INJECTION, SOLUTION INTRAVENOUS ONCE
Refills: 0 | Status: COMPLETED | OUTPATIENT
Start: 2020-10-07 | End: 2020-10-07

## 2020-10-07 RX ADMIN — Medication 400 MILLIGRAM(S): at 22:04

## 2020-10-07 RX ADMIN — RITUXIMAB 219.5 MILLIGRAM(S): 10 INJECTION, SOLUTION INTRAVENOUS at 13:43

## 2020-10-07 RX ADMIN — Medication 400 MILLIGRAM(S): at 06:11

## 2020-10-07 RX ADMIN — Medication 50 MILLIGRAM(S): at 12:38

## 2020-10-07 RX ADMIN — Medication 100 MILLIGRAM(S): at 12:45

## 2020-10-07 RX ADMIN — Medication 650 MILLIGRAM(S): at 12:38

## 2020-10-07 RX ADMIN — Medication 400 MILLIGRAM(S): at 13:23

## 2020-10-07 RX ADMIN — CHLORHEXIDINE GLUCONATE 1 APPLICATION(S): 213 SOLUTION TOPICAL at 12:29

## 2020-10-07 NOTE — PROGRESS NOTE ADULT - SUBJECTIVE AND OBJECTIVE BOX
PLEX performed on 9/3 patient had hives at the end of PLEX, resolved with benadryl. Since then she has been monitored with CBC and hemolytic markers, No events over past 3 days. Seen and examined in her room on 10/6 before PLEX. She had no complaints.  D/w patient and heme for a repeat PLEX before Rituxan as a part of tapering PLEX as well and that patient had high [id number] titers.     PHYSICAL EXAM:  General: WN/WD NAD  Eyes: No jaundice  ENT: MMM  Respiratory: CTA B/L  CV: RRR, no murmurs  Abdominal: Soft, NT, ND +BS  Musculoskeletal/Extremities: full range of motion X 4, no edema  Neurology: A&Ox3, no focal deficits   Skin: No rash, no petechia  Catheters: RIJ dry and intact                        11.0   12.64 )-----------( 418      ( 06 Oct 2020 11:03 )             35.8     10-06    137  |  101  |  16  ----------------------------<  96  4.5   |  23  |  0.70    Ca    9.5      06 Oct 2020 11:03    TPro  6.8  /  Alb  4.3  /  TBili  0.2  /  DBili  x   /  AST  15  /  ALT  15  /  AlkPhos  62  10-06    Lactate Dehydrogenase, Serum: 178 U/L (10-06 @ 11:03)  Haptoglobin, Serum: 242 mg/dL (10-06 @ 15:00)   Assessment and Plan:    72 yo male with bilateral acute on chronic SDH s/p bialteral Zahl hole crani     Neuro: GCS 11. Serial Neurologic assessments Q1h.  Monitor for delirium.  Continue to optimize pain control.      HEENT: no issues    CV: Continue hemodynamic monitoring    Pulm: Pulmonary toilet.  Continue incentive spirometer.  Chest PT.  Encourage OOB to chair and ambulation     GI/Nutrition: Bowel Regimen    /Renal: monitor UOP. Monitor BMP.  Replete Lytes as needed      HEME- DVT prophylaxis, SCDs    ID:  None    Lines/Tubes:  DC Subramanian with TOV    Endo:  Monitor blood glucose    Skin:  Continue wound care    Dispo:  SICU for continued neurologic assessments, avoid secondary brain injury

## 2020-10-07 NOTE — CHART NOTE - NSCHARTNOTEFT_GEN_A_CORE
Nutrition Follow-up  Patient seen for: nutrition follow-up    Source: comprehensive chart review, ( )     Hospital course as per chart: Pt is a 23 yo female with PMH of TTP who presented with bruising, petechiae, headaches, and fatigue, admitted 9/28 with TTP exacerbation. Admitted to MICU for urgent shiley and PLEX/pulse steroids for relapsed TTP. Now on floor. Chart reviewed, events noted.    Diet: Regular    Patient reports ( )    Encouraged PO intake as tolerated ( ).     Education provided:     PO intake:  < 50% [ ] 50-75% [ ]   % [ ]  other :    Source for PO intake:     Current Weight: 296 pounds - wt fluctuations noted, overall with wt loss. Noted with edema. Wt loss likely in setting of fluid shifts vs scale differences, also likely with wt loss in setting of calorie-controlled diet. Will continue to monitor and trend weights.     Pertinent Medications: MEDICATIONS  (STANDING):  acetaminophen   Tablet .. 650 milliGRAM(s) Oral once  acyclovir   Oral Tab/Cap 400 milliGRAM(s) Oral three times a day  chlorhexidine 4% Liquid 1 Application(s) Topical <User Schedule>  diphenhydrAMINE   Injectable 50 milliGRAM(s) IV Push once  influenza   Vaccine 0.5 milliLiter(s) IntraMuscular once  methylPREDNISolone sodium succinate Injectable 100 milliGRAM(s) IV Push once  riTUXimab-pvvr (RUXIENCE) IVPB (eMAR) 878 milliGRAM(s) IV Intermittent once    MEDICATIONS  (PRN):  hydrocortisone sodium succinate Injectable 100 milliGRAM(s) IV Push once PRN rituximab reaction  sodium chloride 0.9% lock flush 10 milliLiter(s) IV Push every 1 hour PRN Pre/post blood products, medications, blood draw, and to maintain line patency    Pertinent Labs:  10-07 Na141 mmol/L Glu 93 mg/dL K+ 4.3 mmol/L Cr  0.63 mg/dL BUN 17 mg/dL 10-01 Phos 3.9 mg/dL 10-06 Alb 4.3 g/dL    Skin: no pressure injuries per flowsheets   Edema: 1+ edema to b/l ankles as per flowsheets    Estimated Needs: no change since previous assessment  Based on weight: 298 pounds for calories,  pounds for protein  Estimated Energy Needs (15-20 calories/kg): 7647-4116 kcal/day  Estimated Protein Needs (1.2-1.4 gm/kg): 68-79 g/day  Estimated Fluid Needs (25-30 ml/kg): 0039-0569 ml/day    Previous Nutrition Diagnosis: Overweight/Obesity - nutrition diagnosis ongoing, being addressed with diet education     New Nutrition Diagnosis: [ ] not applicable    [ ] Inadequate Protein Energy Intake [ ]Inadequate Oral Intake [ ] Excessive Energy Intake     [ ] Underweight [ ] Increased Nutrient Needs [ ] Overweight/Obesity     [ ] Altered GI Function [ ] Unintended Weight Loss [ ] Food & Nutrition Related Knowledge Deficit[ ] Limited Adherence to nutrition related recommendations [ ] Malnutrition  [ ] other: Free text    Recommendations:  1)     Monitoring and Evaluation: Monitor PO intake, weight, labs, skin, GI status, diet     RD remains available upon request and will continue to follow-up per protocol.   Henrietta Mcqueen MS RD CDN pager #[phone] Nutrition Follow-up  Patient seen for: nutrition follow-up    Source: comprehensive chart review, patient    Hospital course as per chart: Pt is a 25 yo female with PMH of TTP who presented with bruising, petechiae, headaches, and fatigue, admitted 9/28 with TTP exacerbation. Admitted to MICU for urgent shiley and PLEX/pulse steroids for relapsed TTP. Now on floor. Chart reviewed, events noted.    Diet: Regular    Patient reports good appetite and PO intake, consuming 100% of meals. Denies nausea/vomiting/diarrhea/constipation. Last BM today (10/7) per pt.     Pt seen by RD 10/1 and provided with extensive diet education. Pt requesting reinforcement    PO intake:  < 50% [ ] 50-75% [ ]   % [ ]  other :    Source for PO intake:     Current Weight: 296 pounds - wt fluctuations noted, overall with wt loss. Noted with edema. Wt loss likely in setting of fluid shifts vs scale differences, also likely with wt loss in setting of calorie-controlled diet. Will continue to monitor and trend weights.     Pertinent Medications: MEDICATIONS  (STANDING):  acetaminophen   Tablet .. 650 milliGRAM(s) Oral once  acyclovir   Oral Tab/Cap 400 milliGRAM(s) Oral three times a day  chlorhexidine 4% Liquid 1 Application(s) Topical <User Schedule>  diphenhydrAMINE   Injectable 50 milliGRAM(s) IV Push once  influenza   Vaccine 0.5 milliLiter(s) IntraMuscular once  methylPREDNISolone sodium succinate Injectable 100 milliGRAM(s) IV Push once  riTUXimab-pvvr (RUXIENCE) IVPB (eMAR) 878 milliGRAM(s) IV Intermittent once    MEDICATIONS  (PRN):  hydrocortisone sodium succinate Injectable 100 milliGRAM(s) IV Push once PRN rituximab reaction  sodium chloride 0.9% lock flush 10 milliLiter(s) IV Push every 1 hour PRN Pre/post blood products, medications, blood draw, and to maintain line patency    Pertinent Labs:  10-07 Na141 mmol/L Glu 93 mg/dL K+ 4.3 mmol/L Cr  0.63 mg/dL BUN 17 mg/dL 10-01 Phos 3.9 mg/dL 10-06 Alb 4.3 g/dL    Skin: no pressure injuries per flowsheets   Edema: 1+ edema to b/l ankles as per flowsheets    Estimated Needs: no change since previous assessment  Based on weight: 298 pounds for calories,  pounds for protein  Estimated Energy Needs (15-20 calories/kg): 9724-0012 kcal/day  Estimated Protein Needs (1.2-1.4 gm/kg): 68-79 g/day  Estimated Fluid Needs (25-30 ml/kg): 8972-3474 ml/day    Previous Nutrition Diagnosis: Overweight/Obesity - nutrition diagnosis ongoing, being addressed with diet education     New Nutrition Diagnosis: [ ] not applicable    [ ] Inadequate Protein Energy Intake [ ]Inadequate Oral Intake [ ] Excessive Energy Intake     [ ] Underweight [ ] Increased Nutrient Needs [ ] Overweight/Obesity     [ ] Altered GI Function [ ] Unintended Weight Loss [ ] Food & Nutrition Related Knowledge Deficit[ ] Limited Adherence to nutrition related recommendations [ ] Malnutrition  [ ] other: Free text    Recommendations:  1)     Monitoring and Evaluation: Monitor PO intake, weight, labs, skin, GI status, diet     RD remains available upon request and will continue to follow-up per protocol.   Henrietta Mcqueen MS RD CDN pager #[phone] Nutrition Follow-up  Patient seen for: nutrition follow-up    Source: comprehensive chart review, patient    Hospital course as per chart: Pt is a 25 yo female with PMH of TTP who presented with bruising, petechiae, headaches, and fatigue, admitted 9/28 with TTP exacerbation. Admitted to MICU for urgent shiley and PLEX/pulse steroids for relapsed TTP. Now on floor. Chart reviewed, events noted.    Diet: Regular    Patient reports good appetite and PO intake, consuming 100% of meals. Denies nausea/vomiting/diarrhea/constipation. Last BM today (10/7) per pt.     Pt seen by RD 10/1 and provided with extensive diet education. Pt requesting reinforcement of diet education and with multiple questions. Advised against keto diet and discussed with patient pros and cons of keto diet. Educated pt on importance of carbohydrates and encouraged adequate whole grain, fruit, and vegetable intake. Encouraged pt to limit saturated fat intake and discussed alternative fat choices. Pt also with questions about intermittent fasting, all questions answered. Pt reports having difficulty with portion control. Discussed strategies for reducing portions including using small plates, portioning out meals and leftovers, and preparing smaller amounts of food. Pt amenable to recommendations. All nutrition-related questions answered. Pt made aware RD remains available.     PO intake:  < 50% [ ] 50-75% [ ]   % [x]      Source for PO intake: patient    Current Weight: 296 pounds - wt fluctuations noted, overall with wt loss. Noted with edema. Wt loss likely in setting of fluid shifts vs scale differences, also likely with wt loss in setting of calorie-controlled diet. Will continue to monitor and trend weights.     Pertinent Medications: MEDICATIONS  (STANDING):  acetaminophen   Tablet .. 650 milliGRAM(s) Oral once  acyclovir   Oral Tab/Cap 400 milliGRAM(s) Oral three times a day  chlorhexidine 4% Liquid 1 Application(s) Topical <User Schedule>  diphenhydrAMINE   Injectable 50 milliGRAM(s) IV Push once  influenza   Vaccine 0.5 milliLiter(s) IntraMuscular once  methylPREDNISolone sodium succinate Injectable 100 milliGRAM(s) IV Push once  riTUXimab-pvvr (RUXIENCE) IVPB (eMAR) 878 milliGRAM(s) IV Intermittent once    MEDICATIONS  (PRN):  hydrocortisone sodium succinate Injectable 100 milliGRAM(s) IV Push once PRN rituximab reaction  sodium chloride 0.9% lock flush 10 milliLiter(s) IV Push every 1 hour PRN Pre/post blood products, medications, blood draw, and to maintain line patency    Pertinent Labs:  10-07 Na141 mmol/L Glu 93 mg/dL K+ 4.3 mmol/L Cr  0.63 mg/dL BUN 17 mg/dL 10-01 Phos 3.9 mg/dL 10-06 Alb 4.3 g/dL    Skin: no pressure injuries per flowsheets   Edema: 1+ edema to b/l ankles as per flowsheets    Estimated Needs: no change since previous assessment  Based on weight: 298 pounds for calories,  pounds for protein  Estimated Energy Needs (15-20 calories/kg): 6050-8358 kcal/day  Estimated Protein Needs (1.2-1.4 gm/kg): 68-79 g/day  Estimated Fluid Needs (25-30 ml/kg): 6764-3399 ml/day    Previous Nutrition Diagnosis: Overweight/Obesity - nutrition diagnosis ongoing, being addressed with diet education     New Nutrition Diagnosis: not applicable    Recommendations:  1) Continue Regular diet. Monitor/adjust as needed.   2) Extensive diet education provided. Patient made aware RD remains available.    Monitoring and Evaluation: Monitor PO intake, weight, labs, skin, GI status, diet     RD remains available upon request and will continue to follow-up per protocol.   Henrietta Mcqueen MS RD CDN pager #[phone]

## 2020-10-07 NOTE — PROGRESS NOTE ADULT - SUBJECTIVE AND OBJECTIVE BOX
INTERVAL HPI/OVERNIGHT EVENTS:  Patient S&E at bedside. No o/n events, patient resting comfortably. No complaints at this time. S/p PLEX yesterday but developed allergic reaction and was stopped prematurely. Plan for Rituximab today.    VITAL SIGNS:  T(F): 97.4 (10-07-20 @ 07:32)  HR: 85 (10-07-20 @ 07:32)  BP: 107/74 (10-07-20 @ 07:32)  RR: 18 (10-07-20 @ 07:32)  SpO2: 98% (10-07-20 @ 07:32)  Wt(kg): --    PHYSICAL EXAM:    Constitutional: NAD  Eyes: EOMI, sclera non-icteric  Neck: supple, no masses, no JVD  Respiratory: CTA b/l, good air entry b/l  Cardiovascular: RRR, no M/R/G  Gastrointestinal: soft, NTND, no masses palpable, + BS, no hepatosplenomegaly  Extremities: no c/c/e  Neurological: AAOx3      MEDICATIONS  (STANDING):  acyclovir   Oral Tab/Cap 400 milliGRAM(s) Oral three times a day  chlorhexidine 4% Liquid 1 Application(s) Topical <User Schedule>  influenza   Vaccine 0.5 milliLiter(s) IntraMuscular once  riTUXimab-pvvr (RUXIENCE) IVPB (eMAR) 878 milliGRAM(s) IV Intermittent once    MEDICATIONS  (PRN):  sodium chloride 0.9% lock flush 10 milliLiter(s) IV Push every 1 hour PRN Pre/post blood products, medications, blood draw, and to maintain line patency      Allergies    No Known Allergies    Intolerances        LABS:                        10.5   12.58 )-----------( 368      ( 07 Oct 2020 10:03 )             33.2     10-07    141  |  104  |  17  ----------------------------<  93  4.3   |  24  |  0.63    Ca    9.4      07 Oct 2020 10:03    TPro  6.8  /  Alb  4.3  /  TBili  0.2  /  DBili  x   /  AST  15  /  ALT  15  /  AlkPhos  62  10-06          RADIOLOGY & ADDITIONAL TESTS:  Studies reviewed.    ASSESSMENT & PLAN:

## 2020-10-07 NOTE — PROGRESS NOTE ADULT - SUBJECTIVE AND OBJECTIVE BOX
Patient is a 24y old  Female who presents with a chief complaint of TTP exacerbation (07 Oct 2020 09:58)      SUBJECTIVE / OVERNIGHT EVENTS: pt had PLEX yesterday but could not be completed     MEDICATIONS  (STANDING):  acetaminophen   Tablet .. 650 milliGRAM(s) Oral once  acyclovir   Oral Tab/Cap 400 milliGRAM(s) Oral three times a day  chlorhexidine 4% Liquid 1 Application(s) Topical <User Schedule>  diphenhydrAMINE   Injectable 50 milliGRAM(s) IV Push once  influenza   Vaccine 0.5 milliLiter(s) IntraMuscular once  methylPREDNISolone sodium succinate Injectable 100 milliGRAM(s) IV Push once  riTUXimab-pvvr (RUXIENCE) IVPB (eMAR) 878 milliGRAM(s) IV Intermittent once    MEDICATIONS  (PRN):  hydrocortisone sodium succinate Injectable 100 milliGRAM(s) IV Push once PRN rituximab reaction  sodium chloride 0.9% lock flush 10 milliLiter(s) IV Push every 1 hour PRN Pre/post blood products, medications, blood draw, and to maintain line patency      Vital Signs Last 24 Hrs  T(C): 36.3 (07 Oct 2020 07:32), Max: 37 (06 Oct 2020 17:45)  T(F): 97.4 (07 Oct 2020 07:32), Max: 98.6 (06 Oct 2020 17:45)  HR: 85 (07 Oct 2020 07:32) (81 - 108)  BP: 107/74 (07 Oct 2020 07:32) (101/61 - 131/95)  BP(mean): --  RR: 18 (07 Oct 2020 07:32) (18 - 18)  SpO2: 98% (07 Oct 2020 07:32) (98% - 99%)  CAPILLARY BLOOD GLUCOSE      I&O's Summary    06 Oct 2020 07:01  -  07 Oct 2020 07:00  --------------------------------------------------------  IN: 0 mL / OUT: 800 mL / NET: -800 mL      PHYSICAL EXAM:  GENERAL: NAD  EYES:  conjunctiva and sclera clear  CHEST/LUNG: Clear to auscultation bilaterally; No wheeze  HEART: +S1/S2, reg   ABDOMEN: Soft, Nontender, Nondistended  EXTREMITIES: no LE edema   PSYCH: AAOx3    LABS:                        10.5   12.58 )-----------( 368      ( 07 Oct 2020 10:03 )             33.2     10-07    141  |  104  |  17  ----------------------------<  93  4.3   |  24  |  0.63    Ca    9.4      07 Oct 2020 10:03    TPro  6.8  /  Alb  4.3  /  TBili  0.2  /  DBili  x   /  AST  15  /  ALT  15  /  AlkPhos  62  10-06

## 2020-10-07 NOTE — ADVANCED PRACTICE NURSE CONSULT - ASSESSMENT
Pt with day 1 of tx, labs noted in sunrise, height, weight and bsa verified, okay to proceed with tx as per MD Montero.  Pt with left piv + blood return noted, no pain, redness or swelling noted at site.  Pt premedicated as noted in sunrise.  Pt administered Rituximab 375 mg/m2 = 878mg iv as per protocol.  Reinforced with pt Rituximab regimen and signs and symptoms to report on to area nurse and staff, pt verbalized understanding.  Emotional support provided.  Report given to area nurse.

## 2020-10-07 NOTE — PROGRESS NOTE ADULT - ASSESSMENT
Patient is a 24y old  Female who presents with a chief complaint of TTP relapse. Started on plasma exchange and s/p  Rituxan.  PLEX#5 on 10/3 and few minutes after completion of the exchange she developed hives and itching which responded to benadryl. Seen and examined in her room on 10/6 before PLEX. She had no complaints. D/w patient and heme for a repeat PLEX before Rituxan as a part of tapering PLEX as well and that patient had high [id number] titers.      PLEX performed on 10/6 with FFP as replacement and 70% into procedure she had hives, and procedure was terminated. Resolved with benadryl. Patient has stable plt count, due for weekly Rituxan on 10/7  She is anxious about leaving the hospital and having an exacerbation that requires another shiley placement.     Dispo per team.

## 2020-10-08 LAB
ALBUMIN SERPL ELPH-MCNC: 3.7 G/DL — SIGNIFICANT CHANGE UP (ref 3.3–5)
ALP SERPL-CCNC: 54 U/L — SIGNIFICANT CHANGE UP (ref 40–120)
ALT FLD-CCNC: 12 U/L — SIGNIFICANT CHANGE UP (ref 10–45)
ANION GAP SERPL CALC-SCNC: 10 MMOL/L — SIGNIFICANT CHANGE UP (ref 5–17)
AST SERPL-CCNC: 10 U/L — SIGNIFICANT CHANGE UP (ref 10–40)
BASOPHILS # BLD AUTO: 0.03 K/UL — SIGNIFICANT CHANGE UP (ref 0–0.2)
BASOPHILS NFR BLD AUTO: 0.2 % — SIGNIFICANT CHANGE UP (ref 0–2)
BILIRUB SERPL-MCNC: 0.1 MG/DL — LOW (ref 0.2–1.2)
BUN SERPL-MCNC: 17 MG/DL — SIGNIFICANT CHANGE UP (ref 7–23)
CALCIUM SERPL-MCNC: 9 MG/DL — SIGNIFICANT CHANGE UP (ref 8.4–10.5)
CHLORIDE SERPL-SCNC: 106 MMOL/L — SIGNIFICANT CHANGE UP (ref 96–108)
CO2 SERPL-SCNC: 22 MMOL/L — SIGNIFICANT CHANGE UP (ref 22–31)
CREAT SERPL-MCNC: 0.56 MG/DL — SIGNIFICANT CHANGE UP (ref 0.5–1.3)
EOSINOPHIL # BLD AUTO: 0.05 K/UL — SIGNIFICANT CHANGE UP (ref 0–0.5)
EOSINOPHIL NFR BLD AUTO: 0.3 % — SIGNIFICANT CHANGE UP (ref 0–6)
GLUCOSE SERPL-MCNC: 88 MG/DL — SIGNIFICANT CHANGE UP (ref 70–99)
HAPTOGLOB SERPL-MCNC: 180 MG/DL — SIGNIFICANT CHANGE UP (ref 34–200)
HCT VFR BLD CALC: 33.6 % — LOW (ref 34.5–45)
HGB BLD-MCNC: 10.5 G/DL — LOW (ref 11.5–15.5)
IMM GRANULOCYTES NFR BLD AUTO: 1.9 % — HIGH (ref 0–1.5)
LDH SERPL L TO P-CCNC: 179 U/L — SIGNIFICANT CHANGE UP (ref 50–242)
LYMPHOCYTES # BLD AUTO: 15.6 % — SIGNIFICANT CHANGE UP (ref 13–44)
LYMPHOCYTES # BLD AUTO: 2.42 K/UL — SIGNIFICANT CHANGE UP (ref 1–3.3)
MCHC RBC-ENTMCNC: 27.1 PG — SIGNIFICANT CHANGE UP (ref 27–34)
MCHC RBC-ENTMCNC: 31.3 GM/DL — LOW (ref 32–36)
MCV RBC AUTO: 86.8 FL — SIGNIFICANT CHANGE UP (ref 80–100)
MONOCYTES # BLD AUTO: 0.84 K/UL — SIGNIFICANT CHANGE UP (ref 0–0.9)
MONOCYTES NFR BLD AUTO: 5.4 % — SIGNIFICANT CHANGE UP (ref 2–14)
NEUTROPHILS # BLD AUTO: 11.91 K/UL — HIGH (ref 1.8–7.4)
NEUTROPHILS NFR BLD AUTO: 76.6 % — SIGNIFICANT CHANGE UP (ref 43–77)
NRBC # BLD: 0 /100 WBCS — SIGNIFICANT CHANGE UP (ref 0–0)
PLATELET # BLD AUTO: 362 K/UL — SIGNIFICANT CHANGE UP (ref 150–400)
POTASSIUM SERPL-MCNC: 3.9 MMOL/L — SIGNIFICANT CHANGE UP (ref 3.5–5.3)
POTASSIUM SERPL-SCNC: 3.9 MMOL/L — SIGNIFICANT CHANGE UP (ref 3.5–5.3)
PROT SERPL-MCNC: 6.3 G/DL — SIGNIFICANT CHANGE UP (ref 6–8.3)
RBC # BLD: 3.87 M/UL — SIGNIFICANT CHANGE UP (ref 3.8–5.2)
RBC # BLD: 3.87 M/UL — SIGNIFICANT CHANGE UP (ref 3.8–5.2)
RBC # FLD: 16.2 % — HIGH (ref 10.3–14.5)
RETICS #: 118 K/UL — SIGNIFICANT CHANGE UP (ref 25–125)
RETICS/RBC NFR: 3.1 % — HIGH (ref 0.5–2.5)
SODIUM SERPL-SCNC: 138 MMOL/L — SIGNIFICANT CHANGE UP (ref 135–145)
URATE SERPL-MCNC: 5.8 MG/DL — SIGNIFICANT CHANGE UP (ref 2.5–7)
WBC # BLD: 15.55 K/UL — HIGH (ref 3.8–10.5)
WBC # FLD AUTO: 15.55 K/UL — HIGH (ref 3.8–10.5)

## 2020-10-08 PROCEDURE — 99233 SBSQ HOSP IP/OBS HIGH 50: CPT

## 2020-10-08 PROCEDURE — 99233 SBSQ HOSP IP/OBS HIGH 50: CPT | Mod: GC

## 2020-10-08 RX ADMIN — Medication 400 MILLIGRAM(S): at 13:32

## 2020-10-08 RX ADMIN — Medication 400 MILLIGRAM(S): at 06:16

## 2020-10-08 RX ADMIN — Medication 400 MILLIGRAM(S): at 22:01

## 2020-10-08 RX ADMIN — CHLORHEXIDINE GLUCONATE 1 APPLICATION(S): 213 SOLUTION TOPICAL at 06:16

## 2020-10-08 NOTE — CHART NOTE - NSCHARTNOTEFT_GEN_A_CORE
10/8	platelets normal today, Hm/onc called and told to remove Shiley. IR called and consult placed but because they didn't place it (placed by MICU) they will not remove it. Spoke to Attending and asked me to remove it.  With full aseptic precaution right sided Shiley was removed, no instant bleeding from the site, firm pressure applied for 10 minutes with hand, no blood seen, no tenderness, dressing applied, pt tolerated the procedure .  Ori Dunn (PA) [employer] # [id number]

## 2020-10-08 NOTE — PROGRESS NOTE ADULT - ASSESSMENT
24 F with hx of TTP, s/p 22 sessions of PLEX at [facility] in 2014 followed by 4 doses of weekly Rituxan presenting with platelet count of 21K concerning for relapse of TTP.     #Relapsed TTP:  - Patient was initially diagnosed at age 18 and was treated at [facility] with 22 sessions of PLEX followed by Rituximab  - Plt 21 on admission  - Peripheral smear with 3-5 schistocytes per HPF  - [id number] < 2, antibody level 83  - s/p 5 days of PLEX, patient with improvement of platelets into normal range. PLEX 10/6. Blood bank following for daily assessment and PLEX need. She had reaction during PLEX so no further PLEX planned. Plts are stable for > 1 week. Plan to remove neck cath and plan discharge over the weekend  - s/p 3 doses solumedrol. 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   - s/p Rituxan on 9/30 and 10/7. Counts are stable for DC. Pt concerned that her counts drop from 250 to 20 in one day and wants to be monitored closely. Plan to DC home this weekend and CBC/MD appt at [facility] on Tuesday 10/13 at 9 am with Dr Medina. Pt aware  - check CBC w DIFF, retic count, CMP, LDH, haptoglobin daily. Hb stable   - CTH negative for bleed    Case d/w medicine team and blood bank team today

## 2020-10-08 NOTE — PROGRESS NOTE ADULT - SUBJECTIVE AND OBJECTIVE BOX
Patient is a 24y old  Female who presents with a chief complaint of TTP exacerbation (07 Oct 2020 13:35)    SUBJECTIVE / OVERNIGHT EVENTS: no acute events overnight, pt received ritux yesterday without any reaction     MEDICATIONS  (STANDING):  acyclovir   Oral Tab/Cap 400 milliGRAM(s) Oral three times a day  chlorhexidine 4% Liquid 1 Application(s) Topical <User Schedule>  influenza   Vaccine 0.5 milliLiter(s) IntraMuscular once    MEDICATIONS  (PRN):  sodium chloride 0.9% lock flush 10 milliLiter(s) IV Push every 1 hour PRN Pre/post blood products, medications, blood draw, and to maintain line patency      Vital Signs Last 24 Hrs  T(C): 37 (08 Oct 2020 08:43), Max: 37.2 (07 Oct 2020 13:40)  T(F): 98.6 (08 Oct 2020 08:43), Max: 99 (07 Oct 2020 21:33)  HR: 103 (08 Oct 2020 08:43) (86 - 119)  BP: 133/92 (08 Oct 2020 08:43) (116/70 - 147/83)  BP(mean): --  RR: 18 (08 Oct 2020 08:43) (16 - 18)  SpO2: 97% (08 Oct 2020 08:43) (97% - 98%)  CAPILLARY BLOOD GLUCOSE        I&O's Summary    07 Oct 2020 07:01  -  08 Oct 2020 07:00  --------------------------------------------------------  IN: 878 mL / OUT: 0 mL / NET: 878 mL      PHYSICAL EXAM:  GENERAL: NAD  EYES: conjunctiva and sclera clear  NECK: catheter in place   CHEST/LUNG: Clear to auscultation bilaterally; No wheeze  HEART: +S1/S2, reg   ABDOMEN: Soft, Nontender, Nondistended  EXTREMITIES:  no LE edema   PSYCH: AAOx3      LABS:                        10.5   15.55 )-----------( 362      ( 08 Oct 2020 09:43 )             33.6     10-08    138  |  106  |  17  ----------------------------<  88  3.9   |  22  |  0.56    Ca    9.0      08 Oct 2020 09:43    TPro  6.3  /  Alb  3.7  /  TBili  0.1<L>  /  DBili  x   /  AST  10  /  ALT  12  /  AlkPhos  54  10-08

## 2020-10-08 NOTE — PROGRESS NOTE ADULT - SUBJECTIVE AND OBJECTIVE BOX
INTERVAL HPI/OVERNIGHT EVENTS:    Patient S&E at bedside. No o/n events, patient sitting up in a chair, working on laptop    VITAL SIGNS:  T(F): 98.6 (10-08-20 @ 08:43)  HR: 103 (10-08-20 @ 08:43)  BP: 133/92 (10-08-20 @ 08:43)  RR: 18 (10-08-20 @ 08:43)  SpO2: 97% (10-08-20 @ 08:43)  Wt(kg): --    PHYSICAL EXAM:    Constitutional: NAD  Eyes: EOMI, sclera non-icteric  Neck: supple, no masses, no JVD  Respiratory: CTA b/l, good air entry b/l  Cardiovascular: RRR, no M/R/G  Gastrointestinal: soft, NTND, no masses palpable, + BS, no hepatosplenomegaly  Extremities: no c/c/e  Neurological: AAOx3      MEDICATIONS  (STANDING):  acyclovir   Oral Tab/Cap 400 milliGRAM(s) Oral three times a day  chlorhexidine 4% Liquid 1 Application(s) Topical <User Schedule>  influenza   Vaccine 0.5 milliLiter(s) IntraMuscular once    MEDICATIONS  (PRN):  sodium chloride 0.9% lock flush 10 milliLiter(s) IV Push every 1 hour PRN Pre/post blood products, medications, blood draw, and to maintain line patency      Allergies    No Known Allergies    Intolerances        LABS:                        10.5   15.55 )-----------( 362      ( 08 Oct 2020 09:43 )             33.6     10-08    138  |  106  |  17  ----------------------------<  88  3.9   |  22  |  0.56    Ca    9.0      08 Oct 2020 09:43    TPro  6.3  /  Alb  3.7  /  TBili  0.1<L>  /  DBili  x   /  AST  10  /  ALT  12  /  AlkPhos  54  10-08          RADIOLOGY & ADDITIONAL TESTS:  Studies reviewed.    ASSESSMENT & PLAN:

## 2020-10-08 NOTE — PROGRESS NOTE ADULT - ASSESSMENT
24F w hx of TTP (22 sessions of PLEX at [facility], high dose steroids, 4 doses Rituxan in 2014) p/w ecchymosis (spontaneous onset RUE 9/25), petechiae (onset BLE 9/27) , headaches,  and fatigue. Patient's platelet count 21k, , and peripheral blood smear w/ schistocytes. Pt admitted to MICU for urgent shiley and PLEX / pulse steroids for relapsed TTP now w/ improving platelet levels.

## 2020-10-09 LAB
ALBUMIN SERPL ELPH-MCNC: 3.6 G/DL — SIGNIFICANT CHANGE UP (ref 3.3–5)
ALP SERPL-CCNC: 59 U/L — SIGNIFICANT CHANGE UP (ref 40–120)
ALT FLD-CCNC: 13 U/L — SIGNIFICANT CHANGE UP (ref 10–45)
ANION GAP SERPL CALC-SCNC: 13 MMOL/L — SIGNIFICANT CHANGE UP (ref 5–17)
AST SERPL-CCNC: 13 U/L — SIGNIFICANT CHANGE UP (ref 10–40)
BASOPHILS # BLD AUTO: 0.04 K/UL — SIGNIFICANT CHANGE UP (ref 0–0.2)
BASOPHILS NFR BLD AUTO: 0.2 % — SIGNIFICANT CHANGE UP (ref 0–2)
BILIRUB SERPL-MCNC: 0.2 MG/DL — SIGNIFICANT CHANGE UP (ref 0.2–1.2)
BUN SERPL-MCNC: 17 MG/DL — SIGNIFICANT CHANGE UP (ref 7–23)
CALCIUM SERPL-MCNC: 8.9 MG/DL — SIGNIFICANT CHANGE UP (ref 8.4–10.5)
CHLORIDE SERPL-SCNC: 102 MMOL/L — SIGNIFICANT CHANGE UP (ref 96–108)
CO2 SERPL-SCNC: 20 MMOL/L — LOW (ref 22–31)
CREAT SERPL-MCNC: 0.64 MG/DL — SIGNIFICANT CHANGE UP (ref 0.5–1.3)
EOSINOPHIL # BLD AUTO: 0.09 K/UL — SIGNIFICANT CHANGE UP (ref 0–0.5)
EOSINOPHIL NFR BLD AUTO: 0.5 % — SIGNIFICANT CHANGE UP (ref 0–6)
GLUCOSE SERPL-MCNC: 80 MG/DL — SIGNIFICANT CHANGE UP (ref 70–99)
HAPTOGLOB SERPL-MCNC: 215 MG/DL — HIGH (ref 34–200)
HCT VFR BLD CALC: 34.8 % — SIGNIFICANT CHANGE UP (ref 34.5–45)
HGB BLD-MCNC: 10.8 G/DL — LOW (ref 11.5–15.5)
IMM GRANULOCYTES NFR BLD AUTO: 1.1 % — SIGNIFICANT CHANGE UP (ref 0–1.5)
LDH SERPL L TO P-CCNC: 175 U/L — SIGNIFICANT CHANGE UP (ref 50–242)
LYMPHOCYTES # BLD AUTO: 13.5 % — SIGNIFICANT CHANGE UP (ref 13–44)
LYMPHOCYTES # BLD AUTO: 2.48 K/UL — SIGNIFICANT CHANGE UP (ref 1–3.3)
MCHC RBC-ENTMCNC: 26.9 PG — LOW (ref 27–34)
MCHC RBC-ENTMCNC: 31 GM/DL — LOW (ref 32–36)
MCV RBC AUTO: 86.6 FL — SIGNIFICANT CHANGE UP (ref 80–100)
MONOCYTES # BLD AUTO: 0.55 K/UL — SIGNIFICANT CHANGE UP (ref 0–0.9)
MONOCYTES NFR BLD AUTO: 3 % — SIGNIFICANT CHANGE UP (ref 2–14)
NEUTROPHILS # BLD AUTO: 15.02 K/UL — HIGH (ref 1.8–7.4)
NEUTROPHILS NFR BLD AUTO: 81.7 % — HIGH (ref 43–77)
NRBC # BLD: 0 /100 WBCS — SIGNIFICANT CHANGE UP (ref 0–0)
PLATELET # BLD AUTO: 343 K/UL — SIGNIFICANT CHANGE UP (ref 150–400)
POTASSIUM SERPL-MCNC: 4.2 MMOL/L — SIGNIFICANT CHANGE UP (ref 3.5–5.3)
POTASSIUM SERPL-SCNC: 4.2 MMOL/L — SIGNIFICANT CHANGE UP (ref 3.5–5.3)
PROT SERPL-MCNC: 6.2 G/DL — SIGNIFICANT CHANGE UP (ref 6–8.3)
RBC # BLD: 4.02 M/UL — SIGNIFICANT CHANGE UP (ref 3.8–5.2)
RBC # BLD: 4.02 M/UL — SIGNIFICANT CHANGE UP (ref 3.8–5.2)
RBC # FLD: 16.2 % — HIGH (ref 10.3–14.5)
RETICS #: 125.8 K/UL — HIGH (ref 25–125)
RETICS/RBC NFR: 3.1 % — HIGH (ref 0.5–2.5)
SODIUM SERPL-SCNC: 135 MMOL/L — SIGNIFICANT CHANGE UP (ref 135–145)
URATE SERPL-MCNC: 7.1 MG/DL — HIGH (ref 2.5–7)
WBC # BLD: 18.38 K/UL — HIGH (ref 3.8–10.5)
WBC # FLD AUTO: 18.38 K/UL — HIGH (ref 3.8–10.5)

## 2020-10-09 PROCEDURE — 99232 SBSQ HOSP IP/OBS MODERATE 35: CPT

## 2020-10-09 PROCEDURE — 99232 SBSQ HOSP IP/OBS MODERATE 35: CPT | Mod: GC

## 2020-10-09 RX ORDER — ACETAMINOPHEN 500 MG
650 TABLET ORAL EVERY 6 HOURS
Refills: 0 | Status: DISCONTINUED | OUTPATIENT
Start: 2020-10-09 | End: 2020-10-10

## 2020-10-09 RX ORDER — IBUPROFEN 200 MG
400 TABLET ORAL EVERY 6 HOURS
Refills: 0 | Status: DISCONTINUED | OUTPATIENT
Start: 2020-10-09 | End: 2020-10-10

## 2020-10-09 RX ORDER — BENZOCAINE AND MENTHOL 5; 1 G/100ML; G/100ML
1 LIQUID ORAL ONCE
Refills: 0 | Status: DISCONTINUED | OUTPATIENT
Start: 2020-10-09 | End: 2020-10-10

## 2020-10-09 RX ORDER — IBUPROFEN 200 MG
400 TABLET ORAL ONCE
Refills: 0 | Status: COMPLETED | OUTPATIENT
Start: 2020-10-09 | End: 2020-10-09

## 2020-10-09 RX ADMIN — CHLORHEXIDINE GLUCONATE 1 APPLICATION(S): 213 SOLUTION TOPICAL at 05:57

## 2020-10-09 RX ADMIN — Medication 400 MILLIGRAM(S): at 21:36

## 2020-10-09 RX ADMIN — Medication 400 MILLIGRAM(S): at 14:57

## 2020-10-09 RX ADMIN — Medication 400 MILLIGRAM(S): at 21:35

## 2020-10-09 RX ADMIN — Medication 400 MILLIGRAM(S): at 22:25

## 2020-10-09 RX ADMIN — Medication 400 MILLIGRAM(S): at 15:30

## 2020-10-09 RX ADMIN — Medication 650 MILLIGRAM(S): at 11:11

## 2020-10-09 RX ADMIN — Medication 400 MILLIGRAM(S): at 05:57

## 2020-10-09 RX ADMIN — Medication 650 MILLIGRAM(S): at 11:45

## 2020-10-09 RX ADMIN — Medication 400 MILLIGRAM(S): at 13:28

## 2020-10-09 NOTE — PROGRESS NOTE ADULT - SUBJECTIVE AND OBJECTIVE BOX
Patient is a 24y old  Female who presents with a chief complaint of TTP exacerbation (08 Oct 2020 13:03)    SUBJECTIVE / OVERNIGHT EVENTS: patient reporting sore throat overnight, concerned that she may have Mono. She was visited by a friend on Saturday and Wednesday this week, and the friend was diagnosed yesterday with Mono. No exchange/exposure to saliva with sick contact has ocurred as per patient. Reporting sore throat while swallowing, has been afebrile.     MEDICATIONS  (STANDING):  acyclovir   Oral Tab/Cap 400 milliGRAM(s) Oral three times a day  benzocaine 15 mG/menthol 3.6 mG (Sugar-Free) Lozenge 1 Lozenge Oral once  chlorhexidine 4% Liquid 1 Application(s) Topical <User Schedule>  influenza   Vaccine 0.5 milliLiter(s) IntraMuscular once    MEDICATIONS  (PRN):  acetaminophen   Tablet .. 650 milliGRAM(s) Oral every 6 hours PRN Temp greater or equal to 38C (100.4F), Mild Pain (1 - 3)  sodium chloride 0.9% lock flush 10 milliLiter(s) IV Push every 1 hour PRN Pre/post blood products, medications, blood draw, and to maintain line patency      Vital Signs Last 24 Hrs  T(C): 36.6 (09 Oct 2020 08:00), Max: 37.1 (08 Oct 2020 13:55)  T(F): 97.9 (09 Oct 2020 08:00), Max: 98.8 (08 Oct 2020 13:55)  HR: 105 (09 Oct 2020 08:00) (98 - 119)  BP: 107/66 (09 Oct 2020 08:00) (107/66 - 150/82)  BP(mean): --  RR: 18 (09 Oct 2020 08:00) (18 - 18)  SpO2: 95% (09 Oct 2020 08:00) (95% - 99%)  CAPILLARY BLOOD GLUCOSE        I&O's Summary      PHYSICAL EXAM:  GENERAL: NAD  EYES: conjunctiva and sclera clear  NECK: schiley removed and dressing in place C/D/I   CHEST/LUNG: Clear to auscultation bilaterally; No wheeze  HEART: +S1/S2, reg   ABDOMEN: Soft, Nontender, Nondistended  EXTREMITIES:  no LE edema   PSYCH: AAOx3  MOUTH: no peritonsillar purulence noted     LABS:                        10.5   15.55 )-----------( 362      ( 08 Oct 2020 09:43 )             33.6     10-08    138  |  106  |  17  ----------------------------<  88  3.9   |  22  |  0.56    Ca    9.0      08 Oct 2020 09:43    TPro  6.3  /  Alb  3.7  /  TBili  0.1<L>  /  DBili  x   /  AST  10  /  ALT  12  /  AlkPhos  54  10-08

## 2020-10-09 NOTE — PROGRESS NOTE ADULT - ASSESSMENT
24F w hx of TTP (22 sessions of PLEX at [facility], high dose steroids, 4 doses Rituxan in 2014) p/w ecchymosis (spontaneous onset RUE 9/25), petechiae (onset BLE 9/27) , headaches,  and fatigue. Patient's platelet count 21k, , and peripheral blood smear w/ schistocytes. Pt admitted to MICU for urgent shiley and PLEX / pulse steroids for relapsed TTP now w/ improving platelet levels.

## 2020-10-09 NOTE — PROVIDER CONTACT NOTE (OTHER) - ACTION/TREATMENT ORDERED:
no orders at this time. Continue to monitor
pending orders
PA will endorsed to team in the morning
RN to admin Tylenol as ordered and perform neuro check as ordered
check CMP, CBC, Lactate
set up phlebectomy kit at bedside, will do shortly by MAMIE Arreaga.

## 2020-10-09 NOTE — PROGRESS NOTE ADULT - ATTENDING COMMENTS
24 F with hx of TTP, s/p 22 sessions of PLEX at [facility] in 2014 followed by 4 doses of weekly Rituxan presenting with platelet count of 21K concerning for relapse of TTP. [id number] < 2, antibody level 83. She is now s/p 5 days of PLEX, patient with improvement of platelets into normal range. Plts are stable for > 1 week. Exchange catheter removed on 10/8/20 at bedside by primary team. She is s/p 3 doses solumedrol. Only did short course of steroids due to multiple AVNs. She is s/p Rituxan on 9/30 and 10/7. Counts are stable for DC. Pt concerned that her counts drop from 250 to 20 in one day and wants to be monitored closely. Plan to DC home this weekend and CBC/MD appt at [facility] on Tuesday 10/13 at 9 am with Dr Medina. Plan for next dose of rituximab on Wednesday 10/14/20.  Pt aware. She has headache, will monitor overnight if any worsening check CT Head. No focal neurologic signs. Reassured patient.

## 2020-10-09 NOTE — PROGRESS NOTE ADULT - ATTENDING COMMENTS
Plan was to discharge patient home today if platelets were stable. However, patient concerned about mono exposure. Too early in clinical course to do serological testing at this time. Will discuss with heme. May need to be monitored clinically.

## 2020-10-09 NOTE — PROVIDER CONTACT NOTE (OTHER) - SITUATION
Pt complaint of sore throat, concerned she might have mononucleosis. Pt stated a friend who visited yesterday, diagnose mononucleosis recently.

## 2020-10-09 NOTE — PROGRESS NOTE ADULT - ASSESSMENT
24 F with hx of TTP, s/p 22 sessions of PLEX at [facility] in 2014 followed by 4 doses of weekly Rituxan presenting with platelet count of 21K concerning for relapse of TTP.     #Relapsed TTP:  - Patient was initially diagnosed at age 18 and was treated at [facility] with 22 sessions of PLEX followed by Rituximab  - Plt 21 on admission  - Peripheral smear with 3-5 schistocytes per HPF  - [id number] < 2, antibody level 83  - s/p 5 days of PLEX, patient with improvement of platelets into normal range. PLEX 10/6. Blood bank following for daily assessment and PLEX need. She had reaction during PLEX so no further PLEX planned. Plts are stable for > 1 week. Exchange catheter removed on 10/8/20 at bedside by primary team  - s/p 3 doses solumedrol. 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   - s/p Rituxan on 9/30 and 10/7. Counts are stable for DC. Pt concerned that her counts drop from 250 to 20 in one day and wants to be monitored closely. Plan to DC home this weekend and CBC/MD appt at [facility] on Tuesday 10/13 at 9 am with Dr Medina. Plan for next dose of rituximab on Wednesday 10/14/20.  Pt aware.   - check CBC w DIFF, retic count, CMP, LDH, haptoglobin daily. Hb stable     #Headache  -ok to monitor overnight; if still persists in AM, would consider head CT ( no neuro deficits noted today on exam)  -analgesics per primary team  -low suspicion for mono, provided patient with reassurance    Discussed with primary team    Dakotah Garcia MD, MPH  Hematology / Oncology Fellow, PGY6  p

## 2020-10-09 NOTE — PROGRESS NOTE ADULT - SUBJECTIVE AND OBJECTIVE BOX
Hematology Follow-up    INTERVAL HPI/OVERNIGHT EVENTS:  Reports headache this morning, 7/10 in intensity. Also endorsing arm pain. Worried she might have contracted EBV from a friend that was visiting her during this hospitalization; denies any fevers or close contact, guest was wearing mask. Exchange catheter removed yesterday, denies any shortness of breath.    Review of Systems: ROS otherwise negative    VITAL SIGNS:  T(F): 98 (10-09-20 @ 14:42)  HR: 114 (10-09-20 @ 14:42)  BP: 112/74 (10-09-20 @ 14:42)  RR: 18 (10-09-20 @ 14:42)  SpO2: 97% (10-09-20 @ 14:42)  Wt(kg): --      PHYSICAL EXAM:  GENERAL: NAD  EYES: conjunctiva and sclera clear  NECK: schiley removed and dressing in place C/D/I   CHEST/LUNG: Clear to auscultation bilaterally; No wheeze  HEART: +S1/S2, reg   ABDOMEN: Soft, Nontender, Nondistended  EXTREMITIES:  no LE edema   PSYCH: AAOx3  MOUTH: no peritonsillar purulence noted       MEDICATIONS  (STANDING):  acyclovir   Oral Tab/Cap 400 milliGRAM(s) Oral three times a day  benzocaine 15 mG/menthol 3.6 mG (Sugar-Free) Lozenge 1 Lozenge Oral once  chlorhexidine 4% Liquid 1 Application(s) Topical <User Schedule>  influenza   Vaccine 0.5 milliLiter(s) IntraMuscular once    MEDICATIONS  (PRN):  acetaminophen   Tablet .. 650 milliGRAM(s) Oral every 6 hours PRN Temp greater or equal to 38C (100.4F), Mild Pain (1 - 3)  sodium chloride 0.9% lock flush 10 milliLiter(s) IV Push every 1 hour PRN Pre/post blood products, medications, blood draw, and to maintain line patency      No Known Allergies      LABS:                        10.8   18.38 )-----------( 343      ( 09 Oct 2020 12:17 )             34.8     10-09    135  |  102  |  17  ----------------------------<  80  4.2   |  20<L>  |  0.64    Ca    8.9      09 Oct 2020 12:17    TPro  6.2  /  Alb  3.6  /  TBili  0.2  /  DBili  x   /  AST  13  /  ALT  13  /  AlkPhos  59  10-09     Haptoglobin, Serum: 215 mg/dL (10-09 @ 16:59)  Lactate Dehydrogenase, Serum: 175 U/L (10-09 @ 12:17)    RADIOLOGY & ADDITIONAL TESTS:  Studies reviewed.

## 2020-10-10 ENCOUNTER — TRANSCRIPTION ENCOUNTER (OUTPATIENT)
Age: 25
End: 2020-10-10

## 2020-10-10 VITALS
OXYGEN SATURATION: 96 % | SYSTOLIC BLOOD PRESSURE: 110 MMHG | HEART RATE: 84 BPM | TEMPERATURE: 98 F | DIASTOLIC BLOOD PRESSURE: 74 MMHG | RESPIRATION RATE: 18 BRPM

## 2020-10-10 DIAGNOSIS — R51.9 HEADACHE, UNSPECIFIED: ICD-10-CM

## 2020-10-10 LAB
ANION GAP SERPL CALC-SCNC: 9 MMOL/L — SIGNIFICANT CHANGE UP (ref 5–17)
BUN SERPL-MCNC: 13 MG/DL — SIGNIFICANT CHANGE UP (ref 7–23)
CALCIUM SERPL-MCNC: 8.3 MG/DL — LOW (ref 8.4–10.5)
CHLORIDE SERPL-SCNC: 107 MMOL/L — SIGNIFICANT CHANGE UP (ref 96–108)
CO2 SERPL-SCNC: 23 MMOL/L — SIGNIFICANT CHANGE UP (ref 22–31)
CREAT SERPL-MCNC: 0.61 MG/DL — SIGNIFICANT CHANGE UP (ref 0.5–1.3)
GLUCOSE SERPL-MCNC: 95 MG/DL — SIGNIFICANT CHANGE UP (ref 70–99)
HCT VFR BLD CALC: 31.7 % — LOW (ref 34.5–45)
HGB BLD-MCNC: 9.9 G/DL — LOW (ref 11.5–15.5)
MCHC RBC-ENTMCNC: 26.8 PG — LOW (ref 27–34)
MCHC RBC-ENTMCNC: 31.2 GM/DL — LOW (ref 32–36)
MCV RBC AUTO: 85.9 FL — SIGNIFICANT CHANGE UP (ref 80–100)
NRBC # BLD: 0 /100 WBCS — SIGNIFICANT CHANGE UP (ref 0–0)
PLATELET # BLD AUTO: 290 K/UL — SIGNIFICANT CHANGE UP (ref 150–400)
POTASSIUM SERPL-MCNC: 4 MMOL/L — SIGNIFICANT CHANGE UP (ref 3.5–5.3)
POTASSIUM SERPL-SCNC: 4 MMOL/L — SIGNIFICANT CHANGE UP (ref 3.5–5.3)
RBC # BLD: 3.69 M/UL — LOW (ref 3.8–5.2)
RBC # FLD: 15.9 % — HIGH (ref 10.3–14.5)
SODIUM SERPL-SCNC: 139 MMOL/L — SIGNIFICANT CHANGE UP (ref 135–145)
WBC # BLD: 13.86 K/UL — HIGH (ref 3.8–10.5)
WBC # FLD AUTO: 13.86 K/UL — HIGH (ref 3.8–10.5)

## 2020-10-10 PROCEDURE — 86901 BLOOD TYPING SEROLOGIC RH(D): CPT

## 2020-10-10 PROCEDURE — P9011: CPT

## 2020-10-10 PROCEDURE — 87389 HIV-1 AG W/HIV-1&-2 AB AG IA: CPT

## 2020-10-10 PROCEDURE — 82803 BLOOD GASES ANY COMBINATION: CPT

## 2020-10-10 PROCEDURE — P9059: CPT

## 2020-10-10 PROCEDURE — 86900 BLOOD TYPING SEROLOGIC ABO: CPT

## 2020-10-10 PROCEDURE — 82330 ASSAY OF CALCIUM: CPT

## 2020-10-10 PROCEDURE — 80048 BASIC METABOLIC PNL TOTAL CA: CPT

## 2020-10-10 PROCEDURE — 86850 RBC ANTIBODY SCREEN: CPT

## 2020-10-10 PROCEDURE — 99285 EMERGENCY DEPT VISIT HI MDM: CPT

## 2020-10-10 PROCEDURE — 99232 SBSQ HOSP IP/OBS MODERATE 35: CPT | Mod: GC

## 2020-10-10 PROCEDURE — 85014 HEMATOCRIT: CPT

## 2020-10-10 PROCEDURE — 85610 PROTHROMBIN TIME: CPT

## 2020-10-10 PROCEDURE — 99239 HOSP IP/OBS DSCHRG MGMT >30: CPT

## 2020-10-10 PROCEDURE — 83735 ASSAY OF MAGNESIUM: CPT

## 2020-10-10 PROCEDURE — 85045 AUTOMATED RETICULOCYTE COUNT: CPT

## 2020-10-10 PROCEDURE — 85730 THROMBOPLASTIN TIME PARTIAL: CPT

## 2020-10-10 PROCEDURE — U0003: CPT

## 2020-10-10 PROCEDURE — 36430 TRANSFUSION BLD/BLD COMPNT: CPT

## 2020-10-10 PROCEDURE — 93005 ELECTROCARDIOGRAM TRACING: CPT

## 2020-10-10 PROCEDURE — 85397 CLOTTING FUNCT ACTIVITY: CPT

## 2020-10-10 PROCEDURE — 84100 ASSAY OF PHOSPHORUS: CPT

## 2020-10-10 PROCEDURE — 36514 APHERESIS PLASMA: CPT

## 2020-10-10 PROCEDURE — 84702 CHORIONIC GONADOTROPIN TEST: CPT

## 2020-10-10 PROCEDURE — 83605 ASSAY OF LACTIC ACID: CPT

## 2020-10-10 PROCEDURE — 71045 X-RAY EXAM CHEST 1 VIEW: CPT

## 2020-10-10 PROCEDURE — 82962 GLUCOSE BLOOD TEST: CPT

## 2020-10-10 PROCEDURE — 83615 LACTATE (LD) (LDH) ENZYME: CPT

## 2020-10-10 PROCEDURE — 86704 HEP B CORE ANTIBODY TOTAL: CPT

## 2020-10-10 PROCEDURE — 70450 CT HEAD/BRAIN W/O DYE: CPT

## 2020-10-10 PROCEDURE — 82435 ASSAY OF BLOOD CHLORIDE: CPT

## 2020-10-10 PROCEDURE — 84132 ASSAY OF SERUM POTASSIUM: CPT

## 2020-10-10 PROCEDURE — 84550 ASSAY OF BLOOD/URIC ACID: CPT

## 2020-10-10 PROCEDURE — 85027 COMPLETE CBC AUTOMATED: CPT

## 2020-10-10 PROCEDURE — 85025 COMPLETE CBC W/AUTO DIFF WBC: CPT

## 2020-10-10 PROCEDURE — 81025 URINE PREGNANCY TEST: CPT

## 2020-10-10 PROCEDURE — 85384 FIBRINOGEN ACTIVITY: CPT

## 2020-10-10 PROCEDURE — 83010 ASSAY OF HAPTOGLOBIN QUANT: CPT

## 2020-10-10 PROCEDURE — 82947 ASSAY GLUCOSE BLOOD QUANT: CPT

## 2020-10-10 PROCEDURE — 80053 COMPREHEN METABOLIC PANEL: CPT

## 2020-10-10 PROCEDURE — 81001 URINALYSIS AUTO W/SCOPE: CPT

## 2020-10-10 PROCEDURE — 84295 ASSAY OF SERUM SODIUM: CPT

## 2020-10-10 PROCEDURE — 85018 HEMOGLOBIN: CPT

## 2020-10-10 PROCEDURE — 80074 ACUTE HEPATITIS PANEL: CPT

## 2020-10-10 RX ORDER — ACETAMINOPHEN 500 MG
2 TABLET ORAL
Qty: 0 | Refills: 0 | DISCHARGE
Start: 2020-10-10

## 2020-10-10 RX ORDER — ACYCLOVIR SODIUM 500 MG
1 VIAL (EA) INTRAVENOUS
Qty: 6 | Refills: 0
Start: 2020-10-10 | End: 2020-10-11

## 2020-10-10 RX ORDER — ACYCLOVIR SODIUM 500 MG
1 VIAL (EA) INTRAVENOUS
Qty: 0 | Refills: 0 | DISCHARGE
Start: 2020-10-10

## 2020-10-10 RX ADMIN — Medication 400 MILLIGRAM(S): at 05:52

## 2020-10-10 RX ADMIN — Medication 400 MILLIGRAM(S): at 13:54

## 2020-10-10 RX ADMIN — Medication 400 MILLIGRAM(S): at 09:00

## 2020-10-10 RX ADMIN — CHLORHEXIDINE GLUCONATE 1 APPLICATION(S): 213 SOLUTION TOPICAL at 08:33

## 2020-10-10 RX ADMIN — Medication 400 MILLIGRAM(S): at 08:33

## 2020-10-10 NOTE — PROGRESS NOTE ADULT - ASSESSMENT
s/p 5 days of PLEX, patient with improvement of platelets into normal range. PLEX 10/6. Blood bank following for daily assessment and PLEX need. She had reaction during PLEX so no further PLEX planned. Plts are stable for > 1 week. Exchange catheter removed on 10/8/20 at bedside by primary team  - s/p 3 doses solumedrol. 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   - s/p Rituxan on 9/30 and 10/7. Counts are stable for DC. Pt concerned that her counts drop from 250 to 20 in one day and wants to be monitored closely. Plan to DC home this weekend and CBC/MD appt at [facility] on Tuesday 10/13 at 9 am with Dr Medina. Plan for next dose of rituximab on Wednesday 10/14/20.  Pt aware.   - check CBC w DIFF, retic count, CMP, LDH, haptoglobin daily. Hb stable     #Headache  -ok to monitor overnight; if still persists in AM, would consider head CT ( no neuro deficits noted today on exam)  -analgesics per primary team  -low suspicion for mono, provided patient with reassurance

## 2020-10-10 NOTE — DISCHARGE NOTE NURSING/CASE MANAGEMENT/SOCIAL WORK - NSDCFUADDAPPT_GEN_ALL_CORE_FT
You are schedule for an appointment in the [facility] on 10/13/20 with Dr. Medina    You are scheduled for rituxan on 10/14/2020 at [facility] at 12pm.

## 2020-10-10 NOTE — PROGRESS NOTE ADULT - PROBLEM SELECTOR PROBLEM 4
Morbid obesity

## 2020-10-10 NOTE — CHART NOTE - NSCHARTNOTESELECT_GEN_ALL_CORE
Event Note/MAR Accept
Malnutrition Notification
Event Note
Medicine NP/Event Note
Nutrition Services
Transfer Note

## 2020-10-10 NOTE — PROGRESS NOTE ADULT - ASSESSMENT
24F w hx of TTP (22 sessions of PLEX at [facility], high dose steroids, 4 doses Rituxan in 2014) p/w ecchymosis (spontaneous onset RUE 9/25), petechiae (onset BLE 9/27) , headaches,  and fatigue. Patient's platelet count 21k, , and peripheral blood smear w/ schistocytes. Pt admitted to MICU for urgent shiley and PLEX / pulse steroids for relapsed TTP now w/ improving platelet levels and this morning 290 and with mild anemia but no evidence of bleeding

## 2020-10-10 NOTE — PROGRESS NOTE ADULT - NUTRITIONAL ASSESSMENT
This patient has been assessed with a concern for Malnutrition and has been determined to have a diagnosis/diagnoses of Morbid obesity/BMI > 40.    This patient is being managed with:   Diet Regular-  Entered: Sep 29 2020 12:48AM    
This patient has been assessed with a concern for Malnutrition and has been determined to have a diagnosis/diagnoses of Morbid obesity/BMI > 40.    This patient is being managed with:   Diet Regular-  Entered: Sep 29 2020 12:48AM      This patient has been assessed with a concern for Malnutrition and has been determined to have a diagnosis/diagnoses of Morbid obesity/BMI > 40.    This patient is being managed with:   Diet Regular-  Entered: Sep 29 2020 12:48AM    
This patient has been assessed with a concern for Malnutrition and has been determined to have a diagnosis/diagnoses of Morbid obesity/BMI > 40.    This patient is being managed with:   Diet Regular-  Entered: Sep 29 2020 12:48AM    

## 2020-10-10 NOTE — PROGRESS NOTE ADULT - REASON FOR ADMISSION
TTP exacerbation
thrombocytopenia
TTP exacerbation

## 2020-10-10 NOTE — PROGRESS NOTE ADULT - PROBLEM SELECTOR PROBLEM 2
AVN (avascular necrosis of bone)

## 2020-10-10 NOTE — PROGRESS NOTE ADULT - PROBLEM SELECTOR PLAN 2
due to chronic steroid use in 2014  pt is concerned about steroid use given the sequelae she experienced last time  will try to minimize steroid use as much as possible
due to chronic steroid use in 2014  pt is concerned about steroid use given the sequelae she experienced last time  will try to minimize steroid use as much as possible - completed pulse dose    monitor ankle pain - may need imaging
due to chronic steroid use in 2014  pt is concerned about steroid use given the sequelae she experienced last time  will try to minimize steroid use as much as possible - completed pulse dose    monitor ankle pain - resolving
due to chronic steroid use in 2014  pt is concerned about steroid use given the sequelae she experienced last time  will try to minimize steroid use as much as possible - completed pulse dose  ankle pain - resolved
due to chronic steroid use in 2014  pt is concerned about steroid use given the sequelae she experienced last time  will try to minimize steroid use as much as possible - completed pulse dose  monitor ankle pain - resolving

## 2020-10-10 NOTE — CHART NOTE - NSCHARTNOTEFT_GEN_A_CORE
Discussed with brandon, Plt 290.  Okay to be discharged.     Patient states headache has resolved and feels well for discharge.     Patient has f/u appt w/ brandon on 10/13 with plans for rituxan on 10/14.    Attending informed of above.     Joseph Paz RPA-C  Dept of Medicine   [id number]

## 2020-10-10 NOTE — PROGRESS NOTE ADULT - SUBJECTIVE AND OBJECTIVE BOX
Hematology Follow-up    INTERVAL HPI/OVERNIGHT EVENTS:  Patient S&E at bedside. No o/n events, patient resting comfortably. No complaints at this time. Patient denies fever, chills, dizziness, weakness, CP, palpitations, SOB, cough, N/V/D/C, dysuria, changes in bowel movements, LE edema.    VITAL SIGNS:  T(F): 97.8 (10-10-20 @ 08:51)  HR: 82 (10-10-20 @ 08:51)  BP: 100/66 (10-10-20 @ 08:51)  RR: 18 (10-10-20 @ 08:51)  SpO2: 95% (10-10-20 @ 08:51)  Wt(kg): --    PHYSICAL EXAM:    Constitutional: AAOx3, NAD,   Eyes: PERRL, EOMI, sclera non-icteric  Neck: supple, no masses, no JVD  Respiratory: CTA b/l, good air entry b/l, no wheezing, rhonchi, rales, with normal respiratory effort and no intercostal retractions  Cardiovascular: RRR, normal S1S2, no M/R/G  Gastrointestinal: soft, NTND, no masses palpable, BS normal in all four quadrants, no HSM  Extremities:  no c/c/e  Neurological: Grossly intact  Skin: Normal temperature    MEDICATIONS  (STANDING):  acyclovir   Oral Tab/Cap 400 milliGRAM(s) Oral three times a day  benzocaine 15 mG/menthol 3.6 mG (Sugar-Free) Lozenge 1 Lozenge Oral once  chlorhexidine 4% Liquid 1 Application(s) Topical <User Schedule>  influenza   Vaccine 0.5 milliLiter(s) IntraMuscular once    MEDICATIONS  (PRN):  acetaminophen   Tablet .. 650 milliGRAM(s) Oral every 6 hours PRN Temp greater or equal to 38C (100.4F), Mild Pain (1 - 3)  ibuprofen  Tablet. 400 milliGRAM(s) Oral every 6 hours PRN Mild Pain (1 - 3)  sodium chloride 0.9% lock flush 10 milliLiter(s) IV Push every 1 hour PRN Pre/post blood products, medications, blood draw, and to maintain line patency      No Known Allergies      LABS:                        10.8   18.38 )-----------( 343      ( 09 Oct 2020 12:17 )             34.8     10-09    135  |  102  |  17  ----------------------------<  80  4.2   |  20<L>  |  0.64    Ca    8.9      09 Oct 2020 12:17    TPro  6.2  /  Alb  3.6  /  TBili  0.2  /  DBili  x   /  AST  13  /  ALT  13  /  AlkPhos  59  10-09     Haptoglobin, Serum: 215 mg/dL (10-09 @ 16:59)  Lactate Dehydrogenase, Serum: 175 U/L (10-09 @ 12:17)        RADIOLOGY & ADDITIONAL TESTS:  Studies reviewed.

## 2020-10-10 NOTE — PROGRESS NOTE ADULT - PROBLEM SELECTOR PLAN 3
On  acyclovir - will need 7-10 days of treatment  started on October 2 On  acyclovir - will complete 10 days of treatment  started on October 2

## 2020-10-10 NOTE — PROGRESS NOTE ADULT - PROBLEM SELECTOR PLAN 5
medical ppx on hold due to thrombocytopenia  ambulating - being discharged
medical ppx on hold due to thrombocytopenia  ambulating - consider addition due to obesity
on hold due to thrombocytopenia  ambulating - consider addition due to obesity
medical ppx on hold due to thrombocytopenia  ambulating - consider addition due to obesity

## 2020-10-10 NOTE — PROGRESS NOTE ADULT - ATTENDING COMMENTS
case d/w floor NP who d/w haem along with CBC and okay to DC and follow up on [facility] on Tuesday next week   Platelets are 290 and slight decrease in Hb   D/W patient and she agrees with the plan   discharge time 35 minutes case d/w floor NP who d/w haem along with CBC and okay to CT and follow up on [facility] on Tuesday next week   Platelets are 290 and slight decrease in Hb 9.9 base line in 10s no clinical evidence of active bleeding  Leukocytosis - decreasing trend. afebrile, no cough no dysuria and  exam is non focal is 13. from 18 likely reactive   D/W patient and she agrees with the plan   discharge time 35 minutes

## 2020-10-10 NOTE — PROGRESS NOTE ADULT - PROBLEM SELECTOR PLAN 1
-on PLEX therapy, had reaction as noted on 10/6 with hives and itching   -Weaned off PLEX   -s/p  rituxan on 10/7   -platelets are improving but slightly decreased and its 290 along with mild drop in Hb   _ evaluated by haem team and reportedly its okay to be DCed and she has an appointment at [facility] on Tuesday
-on PLEX therapy, had reaction as noted on 10/6 with hives and itching   -plan is to wean/taper PLEX   -platelets are improving  -received rituxan on 10/7   -platelet counts are pending  -paco removed 10/8
-on PLEX therapy, had reaction as noted on 10/6 with hives and itching   -plan is to wean/taper PLEX   -platelets are improving  -received rituxan on 10/7   -will monitor platelets and plan for likely discharge tomorrow if stable  -f/u with heme onc
-on PLEX therapy, had reaction as noted yesterday with hives and itching   -plan is to wean/taper PLEX   -f/u heme onc regarding rituxan   -platelets are improving
PLEX through platelet recovery. Heme recs appreciated planned from rituxan
PLEX through platelet recovery. Heme recs appreciated s/p rituxan
platelets improved  continue to monitor  wean PLEX per heme/transfusion med  s/p dose of Rituxan - plan for next dose 10/7
platelets improved  continue to monitor  wean PLEX per heme/transfusion med  s/p dose of Rituxan - plan for next dose 10/7
platelets improved  continue to monitor  wean PLEX per heme/transfusion med s/p #5 PLEX over the weekend  s/p dose of Rituxan - plan for next dose 10/7  platelets remain stable
platelets improved  continue to monitor  wean PLEX per heme/transfusion med s/p #5 PLEX yesterday  s/p dose of Rituxan - plan for next dose 10/7
platelets improved  continue to monitor  wean PLEX per heme/transfusion med s/p #5 PLEX over the weekend  s/p dose of Rituxan - plan for next dose 10/7

## 2020-10-10 NOTE — PROGRESS NOTE ADULT - PROBLEM SELECTOR PLAN 4
nutrition eval apprec as above
nutrition evaluated
nutrition eval apprec as above

## 2020-10-10 NOTE — PROGRESS NOTE ADULT - PROBLEM SELECTOR PROBLEM 3
Herpes labialis without complication
Prophylactic measure
Prophylactic measure
Herpes labialis without complication

## 2020-10-10 NOTE — PROGRESS NOTE ADULT - SUBJECTIVE AND OBJECTIVE BOX
Patient is a 24y old  Female who presents with a chief complaint of TTP exacerbation (10 Oct 2020 09:09)      SUBJECTIVE / OVERNIGHT EVENTS: Patient seen and examined . Her headache is much improved. No CP, SOB, Nausea or blurred vision. No focal weakness. No numbness or tingling on any extremity.     MEDICATIONS  (STANDING):  acyclovir   Oral Tab/Cap 400 milliGRAM(s) Oral three times a day  benzocaine 15 mG/menthol 3.6 mG (Sugar-Free) Lozenge 1 Lozenge Oral once  chlorhexidine 4% Liquid 1 Application(s) Topical <User Schedule>  influenza   Vaccine 0.5 milliLiter(s) IntraMuscular once    MEDICATIONS  (PRN):  acetaminophen   Tablet .. 650 milliGRAM(s) Oral every 6 hours PRN Temp greater or equal to 38C (100.4F), Mild Pain (1 - 3)  ibuprofen  Tablet. 400 milliGRAM(s) Oral every 6 hours PRN Mild Pain (1 - 3)  sodium chloride 0.9% lock flush 10 milliLiter(s) IV Push every 1 hour PRN Pre/post blood products, medications, blood draw, and to maintain line patency      Vital Signs Last 24 Hrs  T(C): 36.6 (10 Oct 2020 08:51), Max: 36.8 (09 Oct 2020 20:19)  T(F): 97.8 (10 Oct 2020 08:51), Max: 98.3 (09 Oct 2020 20:19)  HR: 82 (10 Oct 2020 08:51) (82 - 114)  BP: 100/66 (10 Oct 2020 08:51) (100/66 - 112/74)  BP(mean): --  RR: 18 (10 Oct 2020 08:51) (18 - 18)  SpO2: 95% (10 Oct 2020 08:51) (95% - 98%)  CAPILLARY BLOOD GLUCOSE        I&O's Summary      PHYSICAL EXAM:  GENERAL: NAD, well-developed  HEAD:  Atraumatic, Normocephalic  EYES: EOMI, PERRLA, conjunctiva and sclera clear  NECK: Supple, No JVD  CHEST/LUNG: Clear to auscultation bilaterally; No wheeze  HEART: Regular rate and rhythm; No murmurs, rubs, or gallops  ABDOMEN: Soft, Nontender, Nondistended; Bowel sounds present  EXTREMITIES:  2+ Peripheral Pulses, No clubbing, cyanosis, or edema  PSYCH: AAOx3  NEUROLOGY: non-focal  SKIN: No rashes or lesions    LABS:                        9.9    13.86 )-----------( 290      ( 10 Oct 2020 09:01 )             31.7     10-10    139  |  107  |  13  ----------------------------<  95  4.0   |  23  |  0.61    Ca    8.3<L>      10 Oct 2020 09:01    TPro  6.2  /  Alb  3.6  /  TBili  0.2  /  DBili  x   /  AST  13  /  ALT  13  /  AlkPhos  59  10-09              RADIOLOGY & ADDITIONAL TESTS:    Imaging Personally Reviewed:    Consultant(s) Notes Reviewed:      Care Discussed with Consultants/Other Providers:   Patient is a 24y old  Female who presents with a chief complaint of TTP exacerbation (10 Oct 2020 09:09)      SUBJECTIVE / OVERNIGHT EVENTS: Patient seen and examined . Her headache is much improved. No CP, SOB, Nausea or blurred vision. No focal weakness. No numbness or tingling on any extremity.     MEDICATIONS  (STANDING):  acyclovir   Oral Tab/Cap 400 milliGRAM(s) Oral three times a day  benzocaine 15 mG/menthol 3.6 mG (Sugar-Free) Lozenge 1 Lozenge Oral once  chlorhexidine 4% Liquid 1 Application(s) Topical <User Schedule>  influenza   Vaccine 0.5 milliLiter(s) IntraMuscular once    MEDICATIONS  (PRN):  acetaminophen   Tablet .. 650 milliGRAM(s) Oral every 6 hours PRN Temp greater or equal to 38C (100.4F), Mild Pain (1 - 3)  ibuprofen  Tablet. 400 milliGRAM(s) Oral every 6 hours PRN Mild Pain (1 - 3)  sodium chloride 0.9% lock flush 10 milliLiter(s) IV Push every 1 hour PRN Pre/post blood products, medications, blood draw, and to maintain line patency      Vital Signs Last 24 Hrs  T(C): 36.6 (10 Oct 2020 08:51), Max: 36.8 (09 Oct 2020 20:19)  T(F): 97.8 (10 Oct 2020 08:51), Max: 98.3 (09 Oct 2020 20:19)  HR: 82 (10 Oct 2020 08:51) (82 - 114)  BP: 100/66 (10 Oct 2020 08:51) (100/66 - 112/74)  BP(mean): --  RR: 18 (10 Oct 2020 08:51) (18 - 18)  SpO2: 95% (10 Oct 2020 08:51) (95% - 98%)  CAPILLARY BLOOD GLUCOSE        I&O's Summary      PHYSICAL EXAM:  GENERAL: NAD  HEAD:  Atraumatic, Normocephalic  EYES: EOMI, PERRLA, pink conjunctiva   CHEST/LUNG: Clear to auscultation bilaterally; No wheeze  HEART: Regular rate and rhythm; No murmurs, rubs, or gallops  ABDOMEN: Soft, Nontender, Nondistended; Bowel sounds present  EXTREMITIES:  2+ Peripheral Pulses, No clubbing, cyanosis, or edema  PSYCH: AAOx3  NEUROLOGY: non-focal no focal neurological deficit       LABS:                        9.9    13.86 )-----------( 290      ( 10 Oct 2020 09:01 )             31.7     10-10    139  |  107  |  13  ----------------------------<  95  4.0   |  23  |  0.61    Ca    8.3<L>      10 Oct 2020 09:01    TPro  6.2  /  Alb  3.6  /  TBili  0.2  /  DBili  x   /  AST  13  /  ALT  13  /  AlkPhos  59  10-09              RADIOLOGY & ADDITIONAL TESTS:    Imaging Personally Reviewed:    Consultant(s) Notes Reviewed:      Care Discussed with Consultants/Other Providers:

## 2020-10-10 NOTE — DISCHARGE NOTE NURSING/CASE MANAGEMENT/SOCIAL WORK - PATIENT PORTAL LINK FT
You can access the FollowMyHealth Patient Portal offered by [facility] by registering at the following website: [email or web address]. By joining [facility]’s FollowMyHealth portal, you will also be able to view your health information using other applications (apps) compatible with our system.

## 2020-10-13 ENCOUNTER — RESULT REVIEW (OUTPATIENT)
Age: 25
End: 2020-10-13

## 2020-10-13 ENCOUNTER — APPOINTMENT (OUTPATIENT)
Dept: HEMATOLOGY ONCOLOGY | Facility: CLINIC | Age: 25
End: 2020-10-13
Payer: COMMERCIAL

## 2020-10-13 LAB
BASOPHILS # BLD AUTO: 0.05 K/UL — SIGNIFICANT CHANGE UP (ref 0–0.2)
BASOPHILS NFR BLD AUTO: 0.5 % — SIGNIFICANT CHANGE UP (ref 0–2)
EOSINOPHIL # BLD AUTO: 0.12 K/UL — SIGNIFICANT CHANGE UP (ref 0–0.5)
EOSINOPHIL NFR BLD AUTO: 1.2 % — SIGNIFICANT CHANGE UP (ref 0–6)
HCT VFR BLD CALC: 34.6 % — SIGNIFICANT CHANGE UP (ref 34.5–45)
HGB BLD-MCNC: 11 G/DL — LOW (ref 11.5–15.5)
IMM GRANULOCYTES NFR BLD AUTO: 0.8 % — SIGNIFICANT CHANGE UP (ref 0–1.5)
LYMPHOCYTES # BLD AUTO: 1.88 K/UL — SIGNIFICANT CHANGE UP (ref 1–3.3)
LYMPHOCYTES # BLD AUTO: 18.1 % — SIGNIFICANT CHANGE UP (ref 13–44)
MCHC RBC-ENTMCNC: 27.2 PG — SIGNIFICANT CHANGE UP (ref 27–34)
MCHC RBC-ENTMCNC: 31.8 G/DL — LOW (ref 32–36)
MCV RBC AUTO: 85.6 FL — SIGNIFICANT CHANGE UP (ref 80–100)
MONOCYTES # BLD AUTO: 0.3 K/UL — SIGNIFICANT CHANGE UP (ref 0–0.9)
MONOCYTES NFR BLD AUTO: 2.9 % — SIGNIFICANT CHANGE UP (ref 2–14)
NEUTROPHILS # BLD AUTO: 7.96 K/UL — HIGH (ref 1.8–7.4)
NEUTROPHILS NFR BLD AUTO: 76.5 % — SIGNIFICANT CHANGE UP (ref 43–77)
NRBC # BLD: 0 /100 WBCS — SIGNIFICANT CHANGE UP (ref 0–0)
PLATELET # BLD AUTO: 333 K/UL — SIGNIFICANT CHANGE UP (ref 150–400)
RBC # BLD: 4.04 M/UL — SIGNIFICANT CHANGE UP (ref 3.8–5.2)
RBC # FLD: 15.5 % — HIGH (ref 10.3–14.5)
WBC # BLD: 10.39 K/UL — SIGNIFICANT CHANGE UP (ref 3.8–10.5)
WBC # FLD AUTO: 10.39 K/UL — SIGNIFICANT CHANGE UP (ref 3.8–10.5)

## 2020-10-13 PROCEDURE — 99214 OFFICE O/P EST MOD 30 MIN: CPT

## 2020-10-13 NOTE — RESULTS/DATA
[FreeTextEntry1] : No new labs to review. \par Inpatient lab from 10/8/20 show normal range platelet (362K)and mild low H/H (10.5g).

## 2020-10-13 NOTE — HISTORY OF PRESENT ILLNESS
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 Also found to be iron deficient, presumably GYN origin.\par \par 10/13/2020: The patient recently had a relapse of her TTP, admitted to [facility] when she presented with bruise and petechiae, discharged after PLEX and 2 cycles of RTXN. 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 She presents today for C3 RTXN evaluation. Her last platelet count on 10/8/20 was 362K and Hgb was 10.5 g. Patient has no complaints. \par  [de-identified] : she is feeling well. periods are normal, no recurrence of TTP

## 2020-10-13 NOTE — ASSESSMENT
[FreeTextEntry1] : \par 24-year-old Ms. MEDRANO is evaluated for relapsed TTP. Shes has just been discharged from hospital after PLEX and 2 doses of RTXN. 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 She will continue with RTXN upto six cycles (4 more). Maintenance RTXN for 2 years is discussed but not decided yet. \par \par # Relapse TTP now in remission\par - First episode in 2014, second  episode in 10/2020\par - S/p 2 doses of RTXN\par - Will continue through 6 cycles \par - Proceed with C-3 tomorrow \par - CBC, CMP, LDH, Haptoglobin prior to each cycle, [id number] following every other cycle\par - Reevaluate after 6 cycles of treatment \par - RTC in 4 weeks

## 2020-10-14 ENCOUNTER — APPOINTMENT (OUTPATIENT)
Dept: INFUSION THERAPY | Facility: HOSPITAL | Age: 25
End: 2020-10-14

## 2020-10-14 ENCOUNTER — APPOINTMENT (OUTPATIENT)
Dept: HEMATOLOGY ONCOLOGY | Facility: CLINIC | Age: 25
End: 2020-10-14
Payer: COMMERCIAL

## 2020-10-14 DIAGNOSIS — T45.1X5A ADVERSE EFFECT OF ANTINEOPLASTIC AND IMMUNOSUPPRESSIVE DRUGS, INITIAL ENCOUNTER: ICD-10-CM

## 2020-10-14 PROCEDURE — 99214 OFFICE O/P EST MOD 30 MIN: CPT

## 2020-10-15 DIAGNOSIS — Z51.11 ENCOUNTER FOR ANTINEOPLASTIC CHEMOTHERAPY: ICD-10-CM

## 2020-10-15 DIAGNOSIS — R11.2 NAUSEA WITH VOMITING, UNSPECIFIED: ICD-10-CM

## 2020-10-15 PROBLEM — T45.1X5A CHEMOTHERAPY ADVERSE REACTION, INITIAL ENCOUNTER: Status: ACTIVE | Noted: 2020-10-15

## 2020-10-15 RX ORDER — AZITHROMYCIN 500 MG/1
500 TABLET, FILM COATED ORAL
Qty: 2 | Refills: 0 | Status: COMPLETED | COMMUNITY
Start: 2020-07-10

## 2020-10-15 RX ORDER — ESTRADIOL VALERATE AND ESTRADIOL VALERATE/DIENOGEST 3-2-1(28)
3/2-2/2-3/1 KIT ORAL
Qty: 84 | Refills: 0 | Status: COMPLETED | COMMUNITY
Start: 2020-08-17

## 2020-10-15 RX ORDER — ACYCLOVIR 800 MG/1
800 TABLET ORAL
Qty: 60 | Refills: 0 | Status: COMPLETED | COMMUNITY
Start: 2020-09-24

## 2020-10-15 RX ORDER — CEPHALEXIN 500 MG/1
500 CAPSULE ORAL
Qty: 20 | Refills: 0 | Status: COMPLETED | COMMUNITY
Start: 2020-08-14

## 2020-10-15 RX ORDER — CLINDAMYCIN PHOSPHATE 10 MG/ML
1 SOLUTION TOPICAL
Qty: 30 | Refills: 0 | Status: COMPLETED | COMMUNITY
Start: 2020-09-24

## 2020-10-15 RX ORDER — FLUCONAZOLE 150 MG/1
150 TABLET ORAL
Qty: 2 | Refills: 0 | Status: COMPLETED | COMMUNITY
Start: 2020-07-31

## 2020-10-15 RX ORDER — SULFAMETHOXAZOLE AND TRIMETHOPRIM 400; 80 MG/1; MG/1
400-80 TABLET ORAL
Qty: 6 | Refills: 0 | Status: COMPLETED | COMMUNITY
Start: 2020-07-03

## 2020-10-15 RX ORDER — DOXYCYCLINE HYCLATE 100 MG/1
100 CAPSULE ORAL
Qty: 60 | Refills: 0 | Status: COMPLETED | COMMUNITY
Start: 2020-09-24

## 2020-10-15 RX ORDER — VALACYCLOVIR 1 G/1
1 TABLET, FILM COATED ORAL
Qty: 30 | Refills: 0 | Status: COMPLETED | COMMUNITY
Start: 2020-02-25

## 2020-10-19 ENCOUNTER — RESULT REVIEW (OUTPATIENT)
Age: 25
End: 2020-10-19

## 2020-10-19 ENCOUNTER — LABORATORY RESULT (OUTPATIENT)
Age: 25
End: 2020-10-19

## 2020-10-19 ENCOUNTER — APPOINTMENT (OUTPATIENT)
Dept: HEMATOLOGY ONCOLOGY | Facility: CLINIC | Age: 25
End: 2020-10-19

## 2020-10-19 LAB
AD13AB COM: NORMAL
AD13AB: 49 UNITS/ML
ADAMTS13 ACTIVITY: 4.5 %
ALBUMIN SERPL ELPH-MCNC: 4 G/DL
ALP BLD-CCNC: 69 U/L
ALT SERPL-CCNC: 12 U/L
ANION GAP SERPL CALC-SCNC: 16 MMOL/L
AST SERPL-CCNC: 12 U/L
BASOPHILS # BLD AUTO: 0.06 K/UL — SIGNIFICANT CHANGE UP (ref 0–0.2)
BASOPHILS NFR BLD AUTO: 0.5 % — SIGNIFICANT CHANGE UP (ref 0–2)
BILIRUB SERPL-MCNC: <0.2 MG/DL
BUN SERPL-MCNC: 11 MG/DL
CALCIUM SERPL-MCNC: 9.6 MG/DL
CHLORIDE SERPL-SCNC: 101 MMOL/L
CO2 SERPL-SCNC: 23 MMOL/L
CREAT SERPL-MCNC: 0.6 MG/DL
EOSINOPHIL # BLD AUTO: 0.13 K/UL — SIGNIFICANT CHANGE UP (ref 0–0.5)
EOSINOPHIL NFR BLD AUTO: 1.1 % — SIGNIFICANT CHANGE UP (ref 0–6)
FERRITIN SERPL-MCNC: 89 NG/ML
GLUCOSE SERPL-MCNC: 153 MG/DL
HAPTOGLOB SERPL-MCNC: 343 MG/DL
HBV CORE IGG+IGM SER QL: NONREACTIVE
HBV SURFACE AB SER QL: REACTIVE
HBV SURFACE AG SER QL: NONREACTIVE
HCT VFR BLD CALC: 32.8 % — LOW (ref 34.5–45)
HCV AB SER QL: NONREACTIVE
HCV S/CO RATIO: 0.08 S/CO
HGB BLD-MCNC: 10.5 G/DL — LOW (ref 11.5–15.5)
IMM GRANULOCYTES NFR BLD AUTO: 0.5 % — SIGNIFICANT CHANGE UP (ref 0–1.5)
IRON SATN MFR SERPL: 12 %
IRON SERPL-MCNC: 40 UG/DL
LYMPHOCYTES # BLD AUTO: 19.8 % — SIGNIFICANT CHANGE UP (ref 13–44)
LYMPHOCYTES # BLD AUTO: 2.39 K/UL — SIGNIFICANT CHANGE UP (ref 1–3.3)
MCHC RBC-ENTMCNC: 27.2 PG — SIGNIFICANT CHANGE UP (ref 27–34)
MCHC RBC-ENTMCNC: 32 G/DL — SIGNIFICANT CHANGE UP (ref 32–36)
MCV RBC AUTO: 85 FL — SIGNIFICANT CHANGE UP (ref 80–100)
MONOCYTES # BLD AUTO: 0.57 K/UL — SIGNIFICANT CHANGE UP (ref 0–0.9)
MONOCYTES NFR BLD AUTO: 4.7 % — SIGNIFICANT CHANGE UP (ref 2–14)
NEUTROPHILS # BLD AUTO: 8.89 K/UL — HIGH (ref 1.8–7.4)
NEUTROPHILS NFR BLD AUTO: 73.4 % — SIGNIFICANT CHANGE UP (ref 43–77)
NRBC # BLD: 0 /100 WBCS — SIGNIFICANT CHANGE UP (ref 0–0)
PLATELET # BLD AUTO: 292 K/UL — SIGNIFICANT CHANGE UP (ref 150–400)
POTASSIUM SERPL-SCNC: 4.1 MMOL/L
PROT SERPL-MCNC: 6.5 G/DL
RBC # BLD: 3.86 M/UL — SIGNIFICANT CHANGE UP (ref 3.8–5.2)
RBC # FLD: 15 % — HIGH (ref 10.3–14.5)
SODIUM SERPL-SCNC: 140 MMOL/L
TIBC SERPL-MCNC: 332 UG/DL
UIBC SERPL-MCNC: 291 UG/DL
WBC # BLD: 12.1 K/UL — HIGH (ref 3.8–10.5)
WBC # FLD AUTO: 12.1 K/UL — HIGH (ref 3.8–10.5)

## 2020-10-21 ENCOUNTER — NON-APPOINTMENT (OUTPATIENT)
Age: 25
End: 2020-10-21

## 2020-10-21 ENCOUNTER — RESULT REVIEW (OUTPATIENT)
Age: 25
End: 2020-10-21

## 2020-10-21 ENCOUNTER — APPOINTMENT (OUTPATIENT)
Dept: HEMATOLOGY ONCOLOGY | Facility: CLINIC | Age: 25
End: 2020-10-21
Payer: COMMERCIAL

## 2020-10-21 ENCOUNTER — APPOINTMENT (OUTPATIENT)
Dept: INFUSION THERAPY | Facility: HOSPITAL | Age: 25
End: 2020-10-21

## 2020-10-21 DIAGNOSIS — T45.1X5S ADVERSE EFFECT OF ANTINEOPLASTIC AND IMMUNOSUPPRESSIVE DRUGS, SEQUELA: ICD-10-CM

## 2020-10-21 LAB
BASOPHILS # BLD AUTO: 0.05 K/UL — SIGNIFICANT CHANGE UP (ref 0–0.2)
BASOPHILS NFR BLD AUTO: 0.5 % — SIGNIFICANT CHANGE UP (ref 0–2)
EOSINOPHIL # BLD AUTO: 0.17 K/UL — SIGNIFICANT CHANGE UP (ref 0–0.5)
EOSINOPHIL NFR BLD AUTO: 1.7 % — SIGNIFICANT CHANGE UP (ref 0–6)
HCT VFR BLD CALC: 34 % — LOW (ref 34.5–45)
HGB BLD-MCNC: 10.8 G/DL — LOW (ref 11.5–15.5)
IMM GRANULOCYTES NFR BLD AUTO: 0.7 % — SIGNIFICANT CHANGE UP (ref 0–1.5)
LYMPHOCYTES # BLD AUTO: 2.37 K/UL — SIGNIFICANT CHANGE UP (ref 1–3.3)
LYMPHOCYTES # BLD AUTO: 23 % — SIGNIFICANT CHANGE UP (ref 13–44)
MCHC RBC-ENTMCNC: 26.9 PG — LOW (ref 27–34)
MCHC RBC-ENTMCNC: 31.8 G/DL — LOW (ref 32–36)
MCV RBC AUTO: 84.8 FL — SIGNIFICANT CHANGE UP (ref 80–100)
MONOCYTES # BLD AUTO: 0.39 K/UL — SIGNIFICANT CHANGE UP (ref 0–0.9)
MONOCYTES NFR BLD AUTO: 3.8 % — SIGNIFICANT CHANGE UP (ref 2–14)
NEUTROPHILS # BLD AUTO: 7.24 K/UL — SIGNIFICANT CHANGE UP (ref 1.8–7.4)
NEUTROPHILS NFR BLD AUTO: 70.3 % — SIGNIFICANT CHANGE UP (ref 43–77)
NRBC # BLD: 0 /100 WBCS — SIGNIFICANT CHANGE UP (ref 0–0)
PLATELET # BLD AUTO: 363 K/UL — SIGNIFICANT CHANGE UP (ref 150–400)
RBC # BLD: 4.01 M/UL — SIGNIFICANT CHANGE UP (ref 3.8–5.2)
RBC # FLD: 14.9 % — HIGH (ref 10.3–14.5)
WBC # BLD: 10.29 K/UL — SIGNIFICANT CHANGE UP (ref 3.8–10.5)
WBC # FLD AUTO: 10.29 K/UL — SIGNIFICANT CHANGE UP (ref 3.8–10.5)

## 2020-10-21 PROCEDURE — 99214 OFFICE O/P EST MOD 30 MIN: CPT

## 2020-10-22 ENCOUNTER — TRANSCRIPTION ENCOUNTER (OUTPATIENT)
Age: 25
End: 2020-10-22

## 2020-10-22 PROBLEM — T45.1X5S CHEMOTHERAPY ADVERSE REACTION, SEQUELA: Status: ACTIVE | Noted: 2020-10-22

## 2020-10-22 LAB — SARS-COV-2 N GENE NPH QL NAA+PROBE: NOT DETECTED

## 2020-10-27 ENCOUNTER — APPOINTMENT (OUTPATIENT)
Dept: INFUSION THERAPY | Facility: HOSPITAL | Age: 25
End: 2020-10-27

## 2020-10-27 ENCOUNTER — RESULT REVIEW (OUTPATIENT)
Age: 25
End: 2020-10-27

## 2020-10-27 LAB
BASOPHILS # BLD AUTO: 0.06 K/UL — SIGNIFICANT CHANGE UP (ref 0–0.2)
BASOPHILS NFR BLD AUTO: 0.5 % — SIGNIFICANT CHANGE UP (ref 0–2)
EOSINOPHIL # BLD AUTO: 0.15 K/UL — SIGNIFICANT CHANGE UP (ref 0–0.5)
EOSINOPHIL NFR BLD AUTO: 1.3 % — SIGNIFICANT CHANGE UP (ref 0–6)
HCT VFR BLD CALC: 36.4 % — SIGNIFICANT CHANGE UP (ref 34.5–45)
HGB BLD-MCNC: 11.8 G/DL — SIGNIFICANT CHANGE UP (ref 11.5–15.5)
IMM GRANULOCYTES NFR BLD AUTO: 0.7 % — SIGNIFICANT CHANGE UP (ref 0–1.5)
LYMPHOCYTES # BLD AUTO: 26.1 % — SIGNIFICANT CHANGE UP (ref 13–44)
LYMPHOCYTES # BLD AUTO: 3.1 K/UL — SIGNIFICANT CHANGE UP (ref 1–3.3)
MCHC RBC-ENTMCNC: 26.9 PG — LOW (ref 27–34)
MCHC RBC-ENTMCNC: 32.4 G/DL — SIGNIFICANT CHANGE UP (ref 32–36)
MCV RBC AUTO: 83.1 FL — SIGNIFICANT CHANGE UP (ref 80–100)
MONOCYTES # BLD AUTO: 0.46 K/UL — SIGNIFICANT CHANGE UP (ref 0–0.9)
MONOCYTES NFR BLD AUTO: 3.9 % — SIGNIFICANT CHANGE UP (ref 2–14)
NEUTROPHILS # BLD AUTO: 8.05 K/UL — HIGH (ref 1.8–7.4)
NEUTROPHILS NFR BLD AUTO: 67.5 % — SIGNIFICANT CHANGE UP (ref 43–77)
NRBC # BLD: 0 /100 WBCS — SIGNIFICANT CHANGE UP (ref 0–0)
PLATELET # BLD AUTO: 428 K/UL — HIGH (ref 150–400)
RBC # BLD: 4.38 M/UL — SIGNIFICANT CHANGE UP (ref 3.8–5.2)
RBC # FLD: 14.8 % — HIGH (ref 10.3–14.5)
WBC # BLD: 11.9 K/UL — HIGH (ref 3.8–10.5)
WBC # FLD AUTO: 11.9 K/UL — HIGH (ref 3.8–10.5)

## 2020-10-28 ENCOUNTER — RESULT REVIEW (OUTPATIENT)
Age: 25
End: 2020-10-28

## 2020-10-28 ENCOUNTER — APPOINTMENT (OUTPATIENT)
Dept: INFUSION THERAPY | Facility: HOSPITAL | Age: 25
End: 2020-10-28

## 2020-10-28 ENCOUNTER — APPOINTMENT (OUTPATIENT)
Dept: HEMATOLOGY ONCOLOGY | Facility: CLINIC | Age: 25
End: 2020-10-28
Payer: COMMERCIAL

## 2020-10-28 ENCOUNTER — OUTPATIENT (OUTPATIENT)
Dept: OUTPATIENT SERVICES | Facility: HOSPITAL | Age: 25
LOS: 1 days | Discharge: HOME | End: 2020-10-28

## 2020-10-28 ENCOUNTER — APPOINTMENT (OUTPATIENT)
Dept: CT IMAGING | Facility: IMAGING CENTER | Age: 25
End: 2020-10-28
Payer: COMMERCIAL

## 2020-10-28 ENCOUNTER — OUTPATIENT (OUTPATIENT)
Dept: OUTPATIENT SERVICES | Facility: HOSPITAL | Age: 25
LOS: 1 days | End: 2020-10-28
Payer: COMMERCIAL

## 2020-10-28 ENCOUNTER — APPOINTMENT (OUTPATIENT)
Dept: RADIOLOGY | Facility: IMAGING CENTER | Age: 25
End: 2020-10-28
Payer: COMMERCIAL

## 2020-10-28 VITALS
DIASTOLIC BLOOD PRESSURE: 77 MMHG | WEIGHT: 293 LBS | TEMPERATURE: 98.4 F | OXYGEN SATURATION: 97 % | SYSTOLIC BLOOD PRESSURE: 115 MMHG | BODY MASS INDEX: 47.65 KG/M2 | RESPIRATION RATE: 16 BRPM | HEIGHT: 65.75 IN | HEART RATE: 105 BPM

## 2020-10-28 DIAGNOSIS — M31.1 THROMBOTIC MICROANGIOPATHY: ICD-10-CM

## 2020-10-28 DIAGNOSIS — R05 COUGH: ICD-10-CM

## 2020-10-28 DIAGNOSIS — R07.89 OTHER CHEST PAIN: ICD-10-CM

## 2020-10-28 PROCEDURE — 71045 X-RAY EXAM CHEST 1 VIEW: CPT

## 2020-10-28 PROCEDURE — 70450 CT HEAD/BRAIN W/O DYE: CPT | Mod: 26

## 2020-10-28 PROCEDURE — 99213 OFFICE O/P EST LOW 20 MIN: CPT

## 2020-10-28 PROCEDURE — 99072 ADDL SUPL MATRL&STAF TM PHE: CPT

## 2020-10-28 PROCEDURE — 71045 X-RAY EXAM CHEST 1 VIEW: CPT | Mod: 26

## 2020-10-28 PROCEDURE — 70450 CT HEAD/BRAIN W/O DYE: CPT

## 2020-10-28 NOTE — HISTORY OF PRESENT ILLNESS
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 Also found to be iron deficient, presumably GYN origin.\par \par 10/13/2020: The patient recently had a relapse of her TTP, admitted to [facility] when she presented with bruise and petechiae, discharged after PLEX and 2 cycles of RTXN. 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 She presents today for C3 RTXN evaluation. Her last platelet count on 10/8/20 was 362K and Hgb was 10.5 g. Patient has no complaints. \par  [de-identified] : 10/28/2020: \par Patient presents for a follow up. She has had reaction to 3rd and 4th cycle of RTXN.  Retry attempts failed despite using steroid premedication and high dose steroid following reactions. She also complained of headache since her PLEX catheter was removed in the hospital. The headache is non-remitting. Finally she had some pleuritic chest pain and cough for 2 days. Denies SOB.

## 2020-10-28 NOTE — RESULTS/DATA
[FreeTextEntry1] : \par Inpatient lab from 10/27/20 show normal range H/H, mild high WBC (due to steroid) and platelet (428K; expected after episode of thrombocytopenia). \par

## 2020-10-28 NOTE — ASSESSMENT
[FreeTextEntry1] : \par 24-year-old Ms. MEDRANO is evaluated for relapsed TTP. Shes was discharged from hospital after PLEX and 2 doses of RTXN. 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 She was getting RTXN as outpatient but both on 4th and 5th cycle she developed reaction. Full dose coiuld not be given despite premedication and high dose steroid during reaction. The plan is to continue with RTXN upto six cycles (4 more). Due to the reactions she developed, we decided to give her 1/2 dose twice in two days. Maintenance RTXN for 2 years is discussed but not decided yet. \par \par # Relapse TTP now in remission\par - First episode in 2014, second  episode in 10/2020\par - S/p 2 doses of RTXN as inpatient \par - Plan to continue through 6 cycles \par - Infusion reaction in 3rd and 4th cycle\par - Will give each cycle in two divided doses \par - Cycle 5A on 10/29/2020 and 5B on 10/30/2020\par - CBC, CMP, LDH, Haptoglobin prior to each cycle, [id number] following every other cycle\par - Reevaluate after 6 cycles of treatment \par - RTC in 4 weeks \par \par # Persistent headache and cough and chest pain- Head CT and CXR ordered

## 2020-10-29 ENCOUNTER — RESULT REVIEW (OUTPATIENT)
Age: 25
End: 2020-10-29

## 2020-10-29 ENCOUNTER — APPOINTMENT (OUTPATIENT)
Dept: INFUSION THERAPY | Facility: HOSPITAL | Age: 25
End: 2020-10-29

## 2020-10-29 ENCOUNTER — EMERGENCY (EMERGENCY)
Facility: HOSPITAL | Age: 25
LOS: 1 days | Discharge: HOME | End: 2020-10-29
Attending: EMERGENCY MEDICINE
Payer: COMMERCIAL

## 2020-10-29 ENCOUNTER — APPOINTMENT (OUTPATIENT)
Dept: HEMATOLOGY ONCOLOGY | Facility: CLINIC | Age: 25
End: 2020-10-29
Payer: COMMERCIAL

## 2020-10-29 VITALS
DIASTOLIC BLOOD PRESSURE: 82 MMHG | HEART RATE: 90 BPM | OXYGEN SATURATION: 97 % | WEIGHT: 289.91 LBS | TEMPERATURE: 99 F | SYSTOLIC BLOOD PRESSURE: 122 MMHG | RESPIRATION RATE: 20 BRPM | HEIGHT: 65 IN

## 2020-10-29 VITALS
DIASTOLIC BLOOD PRESSURE: 53 MMHG | RESPIRATION RATE: 18 BRPM | SYSTOLIC BLOOD PRESSURE: 120 MMHG | OXYGEN SATURATION: 99 % | HEART RATE: 96 BPM | TEMPERATURE: 99 F

## 2020-10-29 DIAGNOSIS — T78.40XD ALLERGY, UNSPECIFIED, SUBSEQUENT ENCOUNTER: ICD-10-CM

## 2020-10-29 DIAGNOSIS — M31.1 THROMBOTIC MICROANGIOPATHY: ICD-10-CM

## 2020-10-29 LAB
ALBUMIN SERPL ELPH-MCNC: 4.2 G/DL — SIGNIFICANT CHANGE UP (ref 3.3–5)
ALP SERPL-CCNC: 70 U/L — SIGNIFICANT CHANGE UP (ref 40–120)
ALT FLD-CCNC: 14 U/L — SIGNIFICANT CHANGE UP (ref 10–45)
ANION GAP SERPL CALC-SCNC: 15 MMOL/L — SIGNIFICANT CHANGE UP (ref 5–17)
APTT BLD: 30.1 SEC — SIGNIFICANT CHANGE UP (ref 27.5–35.5)
AST SERPL-CCNC: 17 U/L — SIGNIFICANT CHANGE UP (ref 10–40)
BASOPHILS # BLD AUTO: 0 K/UL — SIGNIFICANT CHANGE UP (ref 0–0.2)
BASOPHILS # BLD AUTO: 0.05 K/UL — SIGNIFICANT CHANGE UP (ref 0–0.2)
BASOPHILS NFR BLD AUTO: 0 % — SIGNIFICANT CHANGE UP (ref 0–2)
BASOPHILS NFR BLD AUTO: 0.5 % — SIGNIFICANT CHANGE UP (ref 0–2)
BILIRUB SERPL-MCNC: 0.2 MG/DL — SIGNIFICANT CHANGE UP (ref 0.2–1.2)
BUN SERPL-MCNC: 11 MG/DL — SIGNIFICANT CHANGE UP (ref 7–23)
CALCIUM SERPL-MCNC: 9.8 MG/DL — SIGNIFICANT CHANGE UP (ref 8.4–10.5)
CHLORIDE SERPL-SCNC: 102 MMOL/L — SIGNIFICANT CHANGE UP (ref 96–108)
CO2 SERPL-SCNC: 18 MMOL/L — LOW (ref 22–31)
CREAT SERPL-MCNC: 0.61 MG/DL — SIGNIFICANT CHANGE UP (ref 0.5–1.3)
EOSINOPHIL # BLD AUTO: 0 K/UL — SIGNIFICANT CHANGE UP (ref 0–0.5)
EOSINOPHIL # BLD AUTO: 0.12 K/UL — SIGNIFICANT CHANGE UP (ref 0–0.5)
EOSINOPHIL NFR BLD AUTO: 0 % — SIGNIFICANT CHANGE UP (ref 0–6)
EOSINOPHIL NFR BLD AUTO: 1.2 % — SIGNIFICANT CHANGE UP (ref 0–6)
GLUCOSE SERPL-MCNC: 130 MG/DL — HIGH (ref 70–99)
HCT VFR BLD CALC: 35 % — SIGNIFICANT CHANGE UP (ref 34.5–45)
HCT VFR BLD CALC: 37.1 % — SIGNIFICANT CHANGE UP (ref 34.5–45)
HGB BLD-MCNC: 11.2 G/DL — LOW (ref 11.5–15.5)
HGB BLD-MCNC: 12 G/DL — SIGNIFICANT CHANGE UP (ref 11.5–15.5)
IMM GRANULOCYTES NFR BLD AUTO: 1.1 % — SIGNIFICANT CHANGE UP (ref 0–1.5)
INR BLD: 1.06 RATIO — SIGNIFICANT CHANGE UP (ref 0.88–1.16)
LYMPHOCYTES # BLD AUTO: 0.72 K/UL — LOW (ref 1–3.3)
LYMPHOCYTES # BLD AUTO: 2.6 K/UL — SIGNIFICANT CHANGE UP (ref 1–3.3)
LYMPHOCYTES # BLD AUTO: 25.8 % — SIGNIFICANT CHANGE UP (ref 13–44)
LYMPHOCYTES # BLD AUTO: 4.4 % — LOW (ref 13–44)
MANUAL SMEAR VERIFICATION: SIGNIFICANT CHANGE UP
MCHC RBC-ENTMCNC: 26.7 PG — LOW (ref 27–34)
MCHC RBC-ENTMCNC: 26.8 PG — LOW (ref 27–34)
MCHC RBC-ENTMCNC: 32 G/DL — SIGNIFICANT CHANGE UP (ref 32–36)
MCHC RBC-ENTMCNC: 32.3 GM/DL — SIGNIFICANT CHANGE UP (ref 32–36)
MCV RBC AUTO: 82.6 FL — SIGNIFICANT CHANGE UP (ref 80–100)
MCV RBC AUTO: 83.7 FL — SIGNIFICANT CHANGE UP (ref 80–100)
MONOCYTES # BLD AUTO: 0.15 K/UL — SIGNIFICANT CHANGE UP (ref 0–0.9)
MONOCYTES # BLD AUTO: 0.52 K/UL — SIGNIFICANT CHANGE UP (ref 0–0.9)
MONOCYTES NFR BLD AUTO: 0.9 % — LOW (ref 2–14)
MONOCYTES NFR BLD AUTO: 5.2 % — SIGNIFICANT CHANGE UP (ref 2–14)
NEUTROPHILS # BLD AUTO: 15.45 K/UL — HIGH (ref 1.8–7.4)
NEUTROPHILS # BLD AUTO: 6.68 K/UL — SIGNIFICANT CHANGE UP (ref 1.8–7.4)
NEUTROPHILS NFR BLD AUTO: 66.2 % — SIGNIFICANT CHANGE UP (ref 43–77)
NEUTROPHILS NFR BLD AUTO: 93 % — HIGH (ref 43–77)
NEUTS BAND # BLD: 1.7 % — SIGNIFICANT CHANGE UP (ref 0–8)
NRBC # BLD: 0 /100 WBCS — SIGNIFICANT CHANGE UP (ref 0–0)
PLAT MORPH BLD: NORMAL — SIGNIFICANT CHANGE UP
PLATELET # BLD AUTO: 436 K/UL — HIGH (ref 150–400)
PLATELET # BLD AUTO: 454 K/UL — HIGH (ref 150–400)
POTASSIUM SERPL-MCNC: 4.4 MMOL/L — SIGNIFICANT CHANGE UP (ref 3.5–5.3)
POTASSIUM SERPL-SCNC: 4.4 MMOL/L — SIGNIFICANT CHANGE UP (ref 3.5–5.3)
PROT SERPL-MCNC: 7.4 G/DL — SIGNIFICANT CHANGE UP (ref 6–8.3)
PROTHROM AB SERPL-ACNC: 12.7 SEC — SIGNIFICANT CHANGE UP (ref 10.6–13.6)
RBC # BLD: 4.18 M/UL — SIGNIFICANT CHANGE UP (ref 3.8–5.2)
RBC # BLD: 4.49 M/UL — SIGNIFICANT CHANGE UP (ref 3.8–5.2)
RBC # FLD: 14.7 % — HIGH (ref 10.3–14.5)
RBC # FLD: 14.7 % — HIGH (ref 10.3–14.5)
RBC BLD AUTO: SIGNIFICANT CHANGE UP
SODIUM SERPL-SCNC: 135 MMOL/L — SIGNIFICANT CHANGE UP (ref 135–145)
WBC # BLD: 10.08 K/UL — SIGNIFICANT CHANGE UP (ref 3.8–10.5)
WBC # BLD: 16.31 K/UL — HIGH (ref 3.8–10.5)
WBC # FLD AUTO: 10.08 K/UL — SIGNIFICANT CHANGE UP (ref 3.8–10.5)
WBC # FLD AUTO: 16.31 K/UL — HIGH (ref 3.8–10.5)

## 2020-10-29 PROCEDURE — 85730 THROMBOPLASTIN TIME PARTIAL: CPT

## 2020-10-29 PROCEDURE — 85025 COMPLETE CBC W/AUTO DIFF WBC: CPT

## 2020-10-29 PROCEDURE — 99284 EMERGENCY DEPT VISIT MOD MDM: CPT

## 2020-10-29 PROCEDURE — 80053 COMPREHEN METABOLIC PANEL: CPT

## 2020-10-29 PROCEDURE — 99214 OFFICE O/P EST MOD 30 MIN: CPT

## 2020-10-29 PROCEDURE — 99283 EMERGENCY DEPT VISIT LOW MDM: CPT

## 2020-10-29 PROCEDURE — 85610 PROTHROMBIN TIME: CPT

## 2020-10-29 PROCEDURE — 85397 CLOTTING FUNCT ACTIVITY: CPT

## 2020-10-29 NOTE — ED PROVIDER NOTE - NSFOLLOWUPINSTRUCTIONS_ED_ALL_ED_FT
No signs of emergency medical condition on today's workup.  Presumptive diagnosis made, but further evaluation may be required by your primary care doctor or specialist for a definitive diagnosis.  Therefore, follow up as directed and if symptoms change/worsen or any emergency conditions, please return to the ER.   your presumptive diagnosis is an allergic reaction     you were given medications with resolution of rash.   you had labs done that didn't show any acute findings.   please follow up with Dr. Dr. Medina.  Return to the emergency department for any new or worsening symptoms including but not limited to the following: tightness in the throat, difficulty breathing, chest tightness, change in voice, or swelling of lips/mouth, and return immediately to the ER.

## 2020-10-29 NOTE — ED PROVIDER NOTE - CLINICAL SUMMARY MEDICAL DECISION MAKING FREE TEXT BOX
23 yo F w/ hx of TTP and bilateral hip replacement, presenting for allergic reaction w/ sweating and hives during Rituxan treatment for TTP treatment today. No difficulty breathing. Responding to treatment. States she completed ~30% of her treatment. Hx of allergic reaction to this treatment in the past, and development purpura previously when treatment was not completed. VSS. Exam as above. No wheezing. Concern for resolving allergic reaction. Consider TTP relapse after incomplete treatment. Will order labs and observe. Dispo per heme recommendations.

## 2020-10-29 NOTE — ED PROVIDER NOTE - PMH
Abdomen soft, nontender, nondistended, bowel sounds present in all 4 quadrants. Cold sore    High cholesterol    Iron deficiency    TTP (thrombotic thrombocytopenic purpura)    TTP (thrombotic thrombocytopenic purpura)

## 2020-10-29 NOTE — ED PROVIDER NOTE - ATTENDING CONTRIBUTION TO CARE
Pt s/p Rituxan treatment with subsequent hives, already received steroids, benadryl prior to arrival, symptoms essentially resolved.  No signs of anaphylaxis including no combination GI or respiratory symptoms. Pt appears well, no rash visualized, no respiratory distress. Pt remains asymptomatic, observation period in ED and request from specialist for blood tests.

## 2020-10-29 NOTE — ED PROVIDER NOTE - PROGRESS NOTE DETAILS
PGY 1 Herson Driscoll: Spoke w/ pt's hematologist. States pt may return home if stable. PGY 1 Herson Driscoll: Spoke w/ pt's hematologist, Dr. Medina, states that if patients rash has resolved and is stable that she can be discharged home to follow up with him.

## 2020-10-29 NOTE — ED PROVIDER NOTE - OBJECTIVE STATEMENT
23 yo F w/ hx of TTP and bilateral hip replacement, presenting for allergic reaction to medication for TTP treatment w/ sweating and hives. Last treated today just prior to ED arrival. Given Benadryl, dexamethasone, Tylenol, and Pepcid. Denies N/V, fevers, SOB, CP and trouble breathing. Denies hx of smoking. 23 yo F w/ hx of TTP and bilateral hip replacement, presenting for allergic reaction w/ sweating and hives during Rituxan treatment for TTP treatment today. Completed ~30% of her treatment today. Reports allergic reaction to this medication previously ~1 wk ago after 50% of treatment, however w/ N/V that time. Pt further states that she relapsed and developed purpura ~2 hrs after that previous failure to complete her treatment. Pt was given Benadryl, dexamethasone, Tylenol, and Pepcid after her symptoms developed today. She denies N/V, fevers, SOB, CP, wheezing, and trouble breathing.

## 2020-10-29 NOTE — ED PROVIDER NOTE - PHYSICAL EXAMINATION
Gen: A&Ox4.   HEENT: Atraumatic. No pharyngeal erythema or exudates. Mucous membranes moist, no scleral icterus.   CV: Regular rate regular rhythm. No M/R/G. No significant lower extremity edema. Distal pulses intact. Capillary refill < 2 seconds.  Resp: Normal effort. CTAB, no rales, rhonchi, or wheezes.  GI: abdomen non tender to palpation, soft and non-distended. No masses appreciated. + BS.  MSK: Erythematous rash over pt's forehead, however otherwise no rash appreciated over thorax and extremities at this time. No open wounds and no significant ecchymosis/bruising appreciated  Neuro: Following commands. Moving extremities spontaneously  Psych: Appropriate mood, cooperative

## 2020-10-29 NOTE — ED CLERICAL - NS ED CLERK NOTE PRE-ARRIVAL INFORMATION; ADDITIONAL PRE-ARRIVAL INFORMATION
CC/Reason For referral: Allergic Reaction to Infusion, Worsening Itch and Hives, Observation and Management, Needs Labs Drawn and [id number] Test  Preferred Consultant(if applicable): Hem/Onc  Who admits for you (if needed): Hem/Onc  Do you have documents you would like to fax over? No  Would you still like to speak to an ED attending? Yes, please call Dr Medina [phone] after patient is seen

## 2020-10-29 NOTE — ED ADULT NURSE NOTE - CHPI ED NUR SYMPTOMS NEG
no difficulty breathing/no difficulty swallowing/no wheezing/no shortness of breath/no vomiting/no swelling of face, tongue/no throat itching/no nausea/no congestion

## 2020-10-29 NOTE — ED PROVIDER NOTE - PATIENT PORTAL LINK FT
You can access the FollowMyHealth Patient Portal offered by [facility] by registering at the following website: [email or web address]. By joining [employer]’s FollowMyHealth portal, you will also be able to view your health information using other applications (apps) compatible with our system. You can access the FollowMyHealth Patient Portal offered by [facility] by registering at the following website: [email or web address]. By joining [employer]’s FollowMyHealth portal, you will also be able to view your health information using other applications (apps) compatible with our system.

## 2020-10-29 NOTE — ED ADULT NURSE REASSESSMENT NOTE - NS ED NURSE REASSESS COMMENT FT1
Pt is AAOx3. VSS. Resting comfortably. Denies sob, palpitations or discomfort. No s/s distress. States she is feeling better and that "my rash is going away".  Left 22g IVL placed prior to arrival to ED not patent and removed. Pending dispo.

## 2020-10-29 NOTE — ED ADULT NURSE NOTE - OBJECTIVE STATEMENT
24y F, A&Ox3, PMH TTP and bilateral hip replacement, presents to the ED for an allergic reaction to medication for TTP treatment at outpatient treatment center. Around 1700, pt broke out sweating with hives. Pt given 100mg Benadryl, 20mg dexamethasone, 1300mg Tylenol, and Pepcid. Pt symptoms began to reside but was brought to the ED for further eval. A&Ox3, gross neuro intact, lungs cta bilaterally, no difficulty speaking in complete sentences, s1s2 heart sounds heard, pulses x 4, abdomen soft nontender nondistended, skin intact. Pt denies fevers/chills, numbness/tingling, weakness, headache, dizziness, vision changes, cp, sob, cough, abd pain, n/v/d, dysuria, hematuria, bloody stools, back pain.

## 2020-10-30 ENCOUNTER — APPOINTMENT (OUTPATIENT)
Dept: INFUSION THERAPY | Facility: HOSPITAL | Age: 25
End: 2020-10-30

## 2020-10-30 DIAGNOSIS — R11.2 NAUSEA WITH VOMITING, UNSPECIFIED: ICD-10-CM

## 2020-10-30 DIAGNOSIS — Z51.11 ENCOUNTER FOR ANTINEOPLASTIC CHEMOTHERAPY: ICD-10-CM

## 2020-10-30 PROBLEM — T78.40XD HYPERSENSITIVITY REACTION, SUBSEQUENT ENCOUNTER: Status: ACTIVE | Noted: 2020-10-30

## 2020-10-30 LAB — SARS-COV-2 RNA SPEC QL NAA+PROBE: SIGNIFICANT CHANGE UP

## 2020-11-02 NOTE — ED POST DISCHARGE NOTE - DETAILS
unable to leave vm. will reattempt tomorrow - Corine Alston PA-C 11/3/20: pt informed of results. rash unchanged since DC and pt denies mucosal bleeding or other new sx. pt in the process of arranging followup with her hematologist. pt given strict return precautions and followup instructions, she verbalized understanding and was appreciative of call. - Raul Pena PA-C

## 2020-11-04 ENCOUNTER — APPOINTMENT (OUTPATIENT)
Dept: INFUSION THERAPY | Facility: HOSPITAL | Age: 25
End: 2020-11-04

## 2020-11-05 ENCOUNTER — APPOINTMENT (OUTPATIENT)
Dept: INFUSION THERAPY | Facility: HOSPITAL | Age: 25
End: 2020-11-05

## 2021-01-01 NOTE — DIETITIAN INITIAL EVALUATION ADULT. - ENERGY NEEDS
Pt. Admitted to NICU bed 29 from L&D at 1657 accompanied by Dr. Willett, Devika RN, and RT.  Pt on room air, comfortable, with no distress. Admission vitals were completed, labs sent per MD order.  ECHO, EKG, Xray-chest and abdomen completed at the bedside.  Father, Dayne, updated and given introduction to the unit.  Will continue to monitor pt closely.   IBW used for protein needs 125 pounds

## 2021-09-23 NOTE — DISCHARGE NOTE NURSING/CASE MANAGEMENT/SOCIAL WORK - NSPROEXTENSIONSOFSELF_GEN_A_NUR
none
Detail Level: Detailed
General Sunscreen Counseling: I recommended a broad spectrum sunscreen with a SPF of 50 or higher.  I explained that SPF 50 sunscreens block approximately 97 percent of the sun's harmful rays.  Sunscreens should be applied at least 15 minutes prior to expected sun exposure and then every 2 hours after that as long as sun exposure continues. If swimming or exercising sunscreen should be reapplied every 45 minutes to an hour after getting wet or sweating.  One ounce, or the equivalent of a shot glass full of sunscreen, is adequate to protect the skin not covered by a bathing suit. I also recommended a lip balm with a sunscreen as well. Sun protective clothing can be used in lieu of sunscreen but must be worn the entire time you are exposed to the sun's rays.\\nMineral based products such as Zinc Oxide or Titanium Dioxide are recommended for patients with sensitive skin.

## 2021-10-19 NOTE — H&P ADULT - DOES THIS PATIENT HAVE A HISTORY OF OR HAS BEEN DX WITH HEART FAILURE?
no Presented w/ JUDITH (Cr 4.58 on admission) 2/2 ATN in the setting of poor PO intake. FeNa 1.2% (intrinsic) on 10/10. Cr now improved, 2.49 today. Renal US demonstrated evidence of chronic kidney disease.   - trend creatinine  - Monitor urine output  - encourage PO intake Pt w/ persistent hyponatremia in the setting of Covid and decreased overall PO intake. Na 130 today. Pt is tolerating PO and has not received IV fluids recently.  - encourage PO intake  - monitor Na daily  - minimize IV fluids in setting of covid

## 2022-03-04 NOTE — PROGRESS NOTE ADULT - ASSESSMENT
24 F with hx of TTP, s/p 22 sessions of PLEX at [facility] in 2014 followed by 4 doses of weekly Rituxan presenting with platelet count of 21K concerning for relapse of TTP.     #Relapsed TTP:  - Patient was initially diagnosed at age 18 and was treated at [facility] with 22 sessions of PLEX followed by Rituximab  - Plt 21 on admission  - Peripheral smear with 3-5 schistocytes per HPF  - [id number] < 2, antibody level 83  - s/p 5 days of PLEX, patient with improvement of platelets into normal range. PLEX 10/6. Blood bank following for daily assessment and PLEX need  - s/p 3 doses solumedrol. 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   - s/p Rituxan on 9/30. Dose #1 of 4 weekly doses. Next dose due 10/7 TODAY. Will keep her in house until completed that dose and if everything stable at that point will plan for d/c. Pt concerned that her counts drop from 250 to 20 in one day and wants to be monitored closely. Plan to DC home friday and CBC/MD appt at [facility] on monday  - check CBC w DIFF, retic count, CMP, LDH, haptoglobin daily. Hb stable   - CTH negative for bleed  - Will see daily.   - NExt dose of rituxan planned for 10/7 today. Pt wishes to f/u at [facility]. Will start appt process so that she has appt next week   [Mediport] : Mediport [No focal deficits] : no focal deficits [Gait normal] : gait normal [No Dysmetria] : no dysmetria  [Normal] : affect appropriate [100: Fully active, normal.] : 100: Fully active, normal.

## 2023-04-02 NOTE — ED PROVIDER NOTE - ATTENDING CONTRIBUTION TO CARE
Speaking Coherently
MD Almendarez:  patient seen and evaluated personally.   I agree with the History & Physical,  Impression & Plan other than what was detailed in my note.  MD Almendarez    25 y/o f tet status uknown, presents to ed w/ left foot pain after stepping on a nail barefoot 20 minutes pta, stated it was short, did not hurt sig and now has no pain. took nothing for it. washed w/ h202, had nausea when it first happened, now asymptoamtic. vitals stable non toxic, well appearing, small puncture to left lateral foot on palm. plan for x ray, t dap, clean, keflex. does not need psuedo coverage

## 2024-01-25 NOTE — ED ADULT NURSE NOTE - NSFALLRSKOUTCOME_ED_ALL_ED
Does the patient have a moderate to severe fever?  No  Has the patient had a serious reaction to a flu shot before?   No  Has the patient ever had Guillian [place] Syndrome within 6 weeks of a previous flu shot?  No  Does the patient have a serious allergy to eggs?  No  Does the patient have an allergy to latex?  No  Is the patient less than 6 months of age?  No    Patient is eligible to receive the vaccine based on all questions being answered as 'No'.    Vaccine Information Statement(s) was given today. This has been reviewed, questions answered, and verbal consent given by Patient for injection(s) and administration of Influenza (Inactivated).    Patient tolerated without incident. See immunization grid for documentation.    
Health Maintenance Due   Topic Date Due    Shingles Vaccine (1 of 2) Never done    Colorectal Cancer Risk - Colonoscopy  08/29/2019    DTaP/Tdap/Td Vaccine (2 - Td or Tdap) 09/14/2022    Diabetes Eye Exam  10/04/2022    DM/CKD Microalbumin  12/14/2022    Influenza Vaccine (1) 09/01/2023    COVID-19 Vaccine (4 - 2023-24 season) 09/01/2023    Diabetes A1C  12/13/2023    Medicare Advantage- Medicare Wellness Visit  01/01/2024    Depression Screening  06/13/2024       Patient is due for topics listed above, she wishes to proceed with Immunization(s) Influenza, but is not proceeding with the rest  
Office Visit      Assessment AND Plan      She  is residing at an assisted living place and was accompanied by her daughter today.  Patient has multiple medical problems .    Lower extremities leg edema left worse than right with superficial wound involving the left shin.  Patient for the most part non mobile due to poor gait and station and does not tend to keep her legs elevated when sitting.  On exam she has bilateral  edema, left worse than right, skin is very dry bilaterally, left shin with superficial wound.  Weak but palpable pedal pulses bilaterally.  Dry and thickened toenails with possible onychomycosis.  At leg ultrasound today was negative for DVT of the left leg.  Instructions were given to keep the legs elevated when sitting, increase activities as tolerated, reduce salt intake, refer to lymphedema clinic for further evaluation including treatment of the edema as well as to be fitted in compression stockings, apply Vaseline to both legs and if the wound does not heal in a month refer to the wound clinic.      Of note she is on Coumadin however her INR was subtherapeutic January 17, 2024.  This is managed by the cardiologist.      CHIEF COMPLAINT    Leg (Bilateral leg swelling, difficulty walking, frequent falls)        History of present illness    She is here today for follow-up.      I have reviewed the past medical, family and social history sections including the medications and allergies listed in the above medical record as well as the nursing notes.       Physical Exam    Vital Signs:  Blood pressure 124/68, pulse 68, resp. rate 20, weight 81.5 kg (179 lb 9.6 oz), not currently breastfeeding.  Constitutional:  No acute distress.  Cardiovascular:  Irregular heart rate  No murmurs.  No rubs.  No crackles  Respiratory:  Normal breath sounds.  No respiratory distress.  No wheezing.      The patient indicates understanding of these issues and agrees with the plan.     
Universal Safety Interventions
